# Patient Record
Sex: FEMALE | Race: WHITE | NOT HISPANIC OR LATINO
[De-identification: names, ages, dates, MRNs, and addresses within clinical notes are randomized per-mention and may not be internally consistent; named-entity substitution may affect disease eponyms.]

---

## 2017-03-17 ENCOUNTER — APPOINTMENT (OUTPATIENT)
Dept: ULTRASOUND IMAGING | Facility: CLINIC | Age: 67
End: 2017-03-17

## 2017-03-17 ENCOUNTER — APPOINTMENT (OUTPATIENT)
Dept: MAMMOGRAPHY | Facility: CLINIC | Age: 67
End: 2017-03-17

## 2017-03-17 ENCOUNTER — OUTPATIENT (OUTPATIENT)
Dept: OUTPATIENT SERVICES | Facility: HOSPITAL | Age: 67
LOS: 1 days | End: 2017-03-17
Payer: MEDICARE

## 2017-03-17 DIAGNOSIS — Z90.12 ACQUIRED ABSENCE OF LEFT BREAST AND NIPPLE: Chronic | ICD-10-CM

## 2017-03-17 DIAGNOSIS — Z98.89 OTHER SPECIFIED POSTPROCEDURAL STATES: Chronic | ICD-10-CM

## 2017-03-17 DIAGNOSIS — Z98.49 CATARACT EXTRACTION STATUS, UNSPECIFIED EYE: Chronic | ICD-10-CM

## 2017-03-17 DIAGNOSIS — Z00.8 ENCOUNTER FOR OTHER GENERAL EXAMINATION: ICD-10-CM

## 2017-03-17 DIAGNOSIS — Z41.1 ENCOUNTER FOR COSMETIC SURGERY: Chronic | ICD-10-CM

## 2017-03-17 DIAGNOSIS — Z90.49 ACQUIRED ABSENCE OF OTHER SPECIFIED PARTS OF DIGESTIVE TRACT: Chronic | ICD-10-CM

## 2017-03-17 DIAGNOSIS — Z90.710 ACQUIRED ABSENCE OF BOTH CERVIX AND UTERUS: Chronic | ICD-10-CM

## 2017-04-13 PROCEDURE — 77063 BREAST TOMOSYNTHESIS BI: CPT

## 2017-04-13 PROCEDURE — 77067 SCR MAMMO BI INCL CAD: CPT

## 2018-04-02 ENCOUNTER — OUTPATIENT (OUTPATIENT)
Dept: OUTPATIENT SERVICES | Facility: HOSPITAL | Age: 68
LOS: 1 days | End: 2018-04-02
Payer: MEDICARE

## 2018-04-02 ENCOUNTER — APPOINTMENT (OUTPATIENT)
Dept: MAMMOGRAPHY | Facility: CLINIC | Age: 68
End: 2018-04-02
Payer: MEDICARE

## 2018-04-02 DIAGNOSIS — Z41.1 ENCOUNTER FOR COSMETIC SURGERY: Chronic | ICD-10-CM

## 2018-04-02 DIAGNOSIS — Z90.12 ACQUIRED ABSENCE OF LEFT BREAST AND NIPPLE: Chronic | ICD-10-CM

## 2018-04-02 DIAGNOSIS — Z00.00 ENCOUNTER FOR GENERAL ADULT MEDICAL EXAMINATION WITHOUT ABNORMAL FINDINGS: ICD-10-CM

## 2018-04-02 DIAGNOSIS — Z98.49 CATARACT EXTRACTION STATUS, UNSPECIFIED EYE: Chronic | ICD-10-CM

## 2018-04-02 DIAGNOSIS — Z90.710 ACQUIRED ABSENCE OF BOTH CERVIX AND UTERUS: Chronic | ICD-10-CM

## 2018-04-02 DIAGNOSIS — Z98.89 OTHER SPECIFIED POSTPROCEDURAL STATES: Chronic | ICD-10-CM

## 2018-04-02 DIAGNOSIS — Z90.49 ACQUIRED ABSENCE OF OTHER SPECIFIED PARTS OF DIGESTIVE TRACT: Chronic | ICD-10-CM

## 2018-04-02 PROCEDURE — 77067 SCR MAMMO BI INCL CAD: CPT | Mod: 26,52,RT

## 2018-04-02 PROCEDURE — 77063 BREAST TOMOSYNTHESIS BI: CPT

## 2018-04-02 PROCEDURE — 77063 BREAST TOMOSYNTHESIS BI: CPT | Mod: 26,52

## 2018-04-02 PROCEDURE — 77067 SCR MAMMO BI INCL CAD: CPT

## 2019-04-10 ENCOUNTER — APPOINTMENT (OUTPATIENT)
Dept: MAMMOGRAPHY | Facility: CLINIC | Age: 69
End: 2019-04-10

## 2019-11-12 ENCOUNTER — OUTPATIENT (OUTPATIENT)
Dept: OUTPATIENT SERVICES | Facility: HOSPITAL | Age: 69
LOS: 1 days | End: 2019-11-12
Payer: MEDICARE

## 2019-11-12 ENCOUNTER — APPOINTMENT (OUTPATIENT)
Dept: ULTRASOUND IMAGING | Facility: CLINIC | Age: 69
End: 2019-11-12
Payer: MEDICARE

## 2019-11-12 ENCOUNTER — APPOINTMENT (OUTPATIENT)
Dept: MAMMOGRAPHY | Facility: CLINIC | Age: 69
End: 2019-11-12
Payer: MEDICARE

## 2019-11-12 DIAGNOSIS — Z00.8 ENCOUNTER FOR OTHER GENERAL EXAMINATION: ICD-10-CM

## 2019-11-12 DIAGNOSIS — Z90.12 ACQUIRED ABSENCE OF LEFT BREAST AND NIPPLE: Chronic | ICD-10-CM

## 2019-11-12 DIAGNOSIS — Z41.1 ENCOUNTER FOR COSMETIC SURGERY: Chronic | ICD-10-CM

## 2019-11-12 DIAGNOSIS — Z98.89 OTHER SPECIFIED POSTPROCEDURAL STATES: Chronic | ICD-10-CM

## 2019-11-12 DIAGNOSIS — Z90.710 ACQUIRED ABSENCE OF BOTH CERVIX AND UTERUS: Chronic | ICD-10-CM

## 2019-11-12 DIAGNOSIS — Z98.49 CATARACT EXTRACTION STATUS, UNSPECIFIED EYE: Chronic | ICD-10-CM

## 2019-11-12 DIAGNOSIS — Z90.49 ACQUIRED ABSENCE OF OTHER SPECIFIED PARTS OF DIGESTIVE TRACT: Chronic | ICD-10-CM

## 2019-11-12 PROCEDURE — 76641 ULTRASOUND BREAST COMPLETE: CPT

## 2019-11-12 PROCEDURE — 77067 SCR MAMMO BI INCL CAD: CPT

## 2019-11-12 PROCEDURE — 76641 ULTRASOUND BREAST COMPLETE: CPT | Mod: 26,RT

## 2019-11-12 PROCEDURE — 77067 SCR MAMMO BI INCL CAD: CPT | Mod: 26,RT,52

## 2019-11-12 PROCEDURE — 77065 DX MAMMO INCL CAD UNI: CPT

## 2019-11-12 PROCEDURE — 77063 BREAST TOMOSYNTHESIS BI: CPT

## 2019-11-12 PROCEDURE — G0279: CPT

## 2019-11-12 PROCEDURE — 77063 BREAST TOMOSYNTHESIS BI: CPT | Mod: 26,52

## 2019-11-25 ENCOUNTER — OUTPATIENT (OUTPATIENT)
Dept: OUTPATIENT SERVICES | Facility: HOSPITAL | Age: 69
LOS: 1 days | End: 2019-11-25
Payer: MEDICARE

## 2019-11-25 ENCOUNTER — RESULT REVIEW (OUTPATIENT)
Age: 69
End: 2019-11-25

## 2019-11-25 ENCOUNTER — APPOINTMENT (OUTPATIENT)
Dept: ULTRASOUND IMAGING | Facility: CLINIC | Age: 69
End: 2019-11-25
Payer: MEDICARE

## 2019-11-25 DIAGNOSIS — Z00.8 ENCOUNTER FOR OTHER GENERAL EXAMINATION: ICD-10-CM

## 2019-11-25 DIAGNOSIS — Z98.89 OTHER SPECIFIED POSTPROCEDURAL STATES: Chronic | ICD-10-CM

## 2019-11-25 DIAGNOSIS — Z41.1 ENCOUNTER FOR COSMETIC SURGERY: Chronic | ICD-10-CM

## 2019-11-25 DIAGNOSIS — Z98.49 CATARACT EXTRACTION STATUS, UNSPECIFIED EYE: Chronic | ICD-10-CM

## 2019-11-25 DIAGNOSIS — Z90.12 ACQUIRED ABSENCE OF LEFT BREAST AND NIPPLE: Chronic | ICD-10-CM

## 2019-11-25 DIAGNOSIS — Z90.49 ACQUIRED ABSENCE OF OTHER SPECIFIED PARTS OF DIGESTIVE TRACT: Chronic | ICD-10-CM

## 2019-11-25 DIAGNOSIS — Z90.710 ACQUIRED ABSENCE OF BOTH CERVIX AND UTERUS: Chronic | ICD-10-CM

## 2019-11-25 PROCEDURE — 77065 DX MAMMO INCL CAD UNI: CPT

## 2019-11-25 PROCEDURE — 88305 TISSUE EXAM BY PATHOLOGIST: CPT

## 2019-11-25 PROCEDURE — 19083 BX BREAST 1ST LESION US IMAG: CPT | Mod: RT

## 2019-11-25 PROCEDURE — 19083 BX BREAST 1ST LESION US IMAG: CPT

## 2019-11-25 PROCEDURE — 88305 TISSUE EXAM BY PATHOLOGIST: CPT | Mod: 26

## 2019-11-25 PROCEDURE — A4648: CPT

## 2019-11-25 PROCEDURE — 77065 DX MAMMO INCL CAD UNI: CPT | Mod: 26,RT

## 2020-03-24 ENCOUNTER — EMERGENCY (EMERGENCY)
Facility: HOSPITAL | Age: 70
LOS: 1 days | Discharge: HOME | End: 2020-03-24
Attending: EMERGENCY MEDICINE
Payer: MEDICARE

## 2020-03-24 VITALS
OXYGEN SATURATION: 98 % | HEART RATE: 84 BPM | DIASTOLIC BLOOD PRESSURE: 60 MMHG | SYSTOLIC BLOOD PRESSURE: 106 MMHG | RESPIRATION RATE: 16 BRPM | HEIGHT: 64 IN | WEIGHT: 259.93 LBS

## 2020-03-24 DIAGNOSIS — Z41.1 ENCOUNTER FOR COSMETIC SURGERY: Chronic | ICD-10-CM

## 2020-03-24 DIAGNOSIS — Z90.12 ACQUIRED ABSENCE OF LEFT BREAST AND NIPPLE: Chronic | ICD-10-CM

## 2020-03-24 DIAGNOSIS — Z90.49 ACQUIRED ABSENCE OF OTHER SPECIFIED PARTS OF DIGESTIVE TRACT: Chronic | ICD-10-CM

## 2020-03-24 DIAGNOSIS — Z98.89 OTHER SPECIFIED POSTPROCEDURAL STATES: Chronic | ICD-10-CM

## 2020-03-24 DIAGNOSIS — Z90.710 ACQUIRED ABSENCE OF BOTH CERVIX AND UTERUS: Chronic | ICD-10-CM

## 2020-03-24 DIAGNOSIS — Z98.49 CATARACT EXTRACTION STATUS, UNSPECIFIED EYE: Chronic | ICD-10-CM

## 2020-03-24 LAB
ALBUMIN SERPL ELPH-MCNC: 3.5 G/DL — SIGNIFICANT CHANGE UP (ref 3.3–5)
ALP SERPL-CCNC: 82 U/L — SIGNIFICANT CHANGE UP (ref 40–120)
ALT FLD-CCNC: 24 U/L — SIGNIFICANT CHANGE UP (ref 10–45)
ANION GAP SERPL CALC-SCNC: 12 MMOL/L — SIGNIFICANT CHANGE UP (ref 5–17)
ANISOCYTOSIS BLD QL: SLIGHT — SIGNIFICANT CHANGE UP
APTT BLD: 32.3 SEC — SIGNIFICANT CHANGE UP (ref 27.5–36.3)
AST SERPL-CCNC: 22 U/L — SIGNIFICANT CHANGE UP (ref 10–40)
BASOPHILS # BLD AUTO: 0.01 K/UL — SIGNIFICANT CHANGE UP (ref 0–0.2)
BASOPHILS NFR BLD AUTO: 0.4 % — SIGNIFICANT CHANGE UP (ref 0–2)
BILIRUB SERPL-MCNC: 0.2 MG/DL — SIGNIFICANT CHANGE UP (ref 0.2–1.2)
BLD GP AB SCN SERPL QL: NEGATIVE — SIGNIFICANT CHANGE UP
BUN SERPL-MCNC: 23 MG/DL — SIGNIFICANT CHANGE UP (ref 7–23)
CALCIUM SERPL-MCNC: 7.9 MG/DL — LOW (ref 8.4–10.5)
CHLORIDE SERPL-SCNC: 107 MMOL/L — SIGNIFICANT CHANGE UP (ref 96–108)
CO2 SERPL-SCNC: 21 MMOL/L — LOW (ref 22–31)
CREAT SERPL-MCNC: 0.99 MG/DL — SIGNIFICANT CHANGE UP (ref 0.5–1.3)
DACRYOCYTES BLD QL SMEAR: SLIGHT — SIGNIFICANT CHANGE UP
EOSINOPHIL # BLD AUTO: 0.01 K/UL — SIGNIFICANT CHANGE UP (ref 0–0.5)
EOSINOPHIL NFR BLD AUTO: 0.4 % — SIGNIFICANT CHANGE UP (ref 0–6)
GLUCOSE BLDC GLUCOMTR-MCNC: 174 MG/DL — HIGH (ref 70–99)
GLUCOSE SERPL-MCNC: 160 MG/DL — HIGH (ref 70–99)
HCT VFR BLD CALC: 21.4 % — LOW (ref 34.5–45)
HGB BLD-MCNC: 7 G/DL — CRITICAL LOW (ref 11.5–15.5)
IMM GRANULOCYTES NFR BLD AUTO: 1.2 % — SIGNIFICANT CHANGE UP (ref 0–1.5)
INR BLD: 1.92 RATIO — HIGH (ref 0.88–1.16)
LDH SERPL L TO P-CCNC: 181 U/L — SIGNIFICANT CHANGE UP (ref 50–242)
LYMPHOCYTES # BLD AUTO: 1.45 K/UL — SIGNIFICANT CHANGE UP (ref 1–3.3)
LYMPHOCYTES # BLD AUTO: 59.4 % — HIGH (ref 13–44)
MACROCYTES BLD QL: SLIGHT — SIGNIFICANT CHANGE UP
MANUAL SMEAR VERIFICATION: SIGNIFICANT CHANGE UP
MCHC RBC-ENTMCNC: 31.4 PG — SIGNIFICANT CHANGE UP (ref 27–34)
MCHC RBC-ENTMCNC: 32.7 GM/DL — SIGNIFICANT CHANGE UP (ref 32–36)
MCV RBC AUTO: 96 FL — SIGNIFICANT CHANGE UP (ref 80–100)
MICROCYTES BLD QL: SLIGHT — SIGNIFICANT CHANGE UP
MONOCYTES # BLD AUTO: 0.21 K/UL — SIGNIFICANT CHANGE UP (ref 0–0.9)
MONOCYTES NFR BLD AUTO: 8.6 % — SIGNIFICANT CHANGE UP (ref 2–14)
NEUTROPHILS # BLD AUTO: 0.73 K/UL — LOW (ref 1.8–7.4)
NEUTROPHILS NFR BLD AUTO: 30 % — LOW (ref 43–77)
OB PNL STL: NEGATIVE — SIGNIFICANT CHANGE UP
PLAT MORPH BLD: NORMAL — SIGNIFICANT CHANGE UP
PLATELET # BLD AUTO: 15 K/UL — CRITICAL LOW (ref 150–400)
POLYCHROMASIA BLD QL SMEAR: SLIGHT — SIGNIFICANT CHANGE UP
POTASSIUM SERPL-MCNC: 4.3 MMOL/L — SIGNIFICANT CHANGE UP (ref 3.5–5.3)
POTASSIUM SERPL-SCNC: 4.3 MMOL/L — SIGNIFICANT CHANGE UP (ref 3.5–5.3)
PROT SERPL-MCNC: 6.3 G/DL — SIGNIFICANT CHANGE UP (ref 6–8.3)
PROTHROM AB SERPL-ACNC: 22.5 SEC — HIGH (ref 10–12.9)
RBC # BLD: 2.23 M/UL — LOW (ref 3.8–5.2)
RBC # FLD: 17.1 % — HIGH (ref 10.3–14.5)
RBC BLD AUTO: ABNORMAL
RH IG SCN BLD-IMP: POSITIVE — SIGNIFICANT CHANGE UP
SODIUM SERPL-SCNC: 140 MMOL/L — SIGNIFICANT CHANGE UP (ref 135–145)
WBC # BLD: 2.44 K/UL — LOW (ref 3.8–10.5)
WBC # FLD AUTO: 2.44 K/UL — LOW (ref 3.8–10.5)

## 2020-03-24 PROCEDURE — 71045 X-RAY EXAM CHEST 1 VIEW: CPT | Mod: 26

## 2020-03-24 PROCEDURE — 99218: CPT

## 2020-03-24 RX ORDER — DEXTROSE 50 % IN WATER 50 %
12.5 SYRINGE (ML) INTRAVENOUS ONCE
Refills: 0 | Status: DISCONTINUED | OUTPATIENT
Start: 2020-03-24 | End: 2020-03-28

## 2020-03-24 RX ORDER — DEXTROSE 50 % IN WATER 50 %
15 SYRINGE (ML) INTRAVENOUS ONCE
Refills: 0 | Status: DISCONTINUED | OUTPATIENT
Start: 2020-03-24 | End: 2020-03-28

## 2020-03-24 RX ORDER — DEXTROSE 50 % IN WATER 50 %
25 SYRINGE (ML) INTRAVENOUS ONCE
Refills: 0 | Status: DISCONTINUED | OUTPATIENT
Start: 2020-03-24 | End: 2020-03-28

## 2020-03-24 RX ORDER — HUMAN INSULIN 100 [IU]/ML
20 INJECTION, SUSPENSION SUBCUTANEOUS AT BEDTIME
Refills: 0 | Status: DISCONTINUED | OUTPATIENT
Start: 2020-03-24 | End: 2020-03-28

## 2020-03-24 RX ORDER — SODIUM CHLORIDE 9 MG/ML
1000 INJECTION, SOLUTION INTRAVENOUS
Refills: 0 | Status: DISCONTINUED | OUTPATIENT
Start: 2020-03-24 | End: 2020-03-28

## 2020-03-24 RX ORDER — CALCIUM CARBONATE 500(1250)
1 TABLET ORAL ONCE
Refills: 0 | Status: COMPLETED | OUTPATIENT
Start: 2020-03-24 | End: 2020-03-25

## 2020-03-24 RX ORDER — AMLODIPINE BESYLATE 2.5 MG/1
10 TABLET ORAL ONCE
Refills: 0 | Status: COMPLETED | OUTPATIENT
Start: 2020-03-24 | End: 2020-03-25

## 2020-03-24 RX ORDER — HUMAN INSULIN 100 [IU]/ML
20 INJECTION, SUSPENSION SUBCUTANEOUS
Refills: 0 | Status: DISCONTINUED | OUTPATIENT
Start: 2020-03-24 | End: 2020-03-28

## 2020-03-24 RX ORDER — GLUCAGON INJECTION, SOLUTION 0.5 MG/.1ML
1 INJECTION, SOLUTION SUBCUTANEOUS ONCE
Refills: 0 | Status: DISCONTINUED | OUTPATIENT
Start: 2020-03-24 | End: 2020-03-28

## 2020-03-24 RX ORDER — LOSARTAN POTASSIUM 100 MG/1
100 TABLET, FILM COATED ORAL DAILY
Refills: 0 | Status: DISCONTINUED | OUTPATIENT
Start: 2020-03-24 | End: 2020-03-28

## 2020-03-24 RX ORDER — ATORVASTATIN CALCIUM 80 MG/1
10 TABLET, FILM COATED ORAL AT BEDTIME
Refills: 0 | Status: DISCONTINUED | OUTPATIENT
Start: 2020-03-24 | End: 2020-03-28

## 2020-03-24 RX ADMIN — HUMAN INSULIN 20 UNIT(S): 100 INJECTION, SUSPENSION SUBCUTANEOUS at 18:54

## 2020-03-24 NOTE — ED PROVIDER NOTE - PSH
H/O rhinoplasty    S/P cataract extraction, unspecified laterality  bilateral  S/P cholecystectomy  1980, open  S/P hysterectomy with oophorectomy  2011 for dermoid cyst x 2  S/P left mastectomy  1994  S/P lumpectomy, right breast  2001

## 2020-03-24 NOTE — ED CDU PROVIDER INITIAL DAY NOTE - PROGRESS NOTE DETAILS
D/w ED FT attending Dr. Gill regarding repeat CBC for platelets, will repeat all after 2nd unit CBC transfusion. - Vijay Adair PA-C CDU PROGRESS NOTE DONNA GONZALEZ: Received pt at 1900 sign-out. Pt resting in stretcher in NAD, completed 1st unit PRBC. Case/plan reviewed. VSS. Pt is aox3, ambulatory around unit with steady gait. S1 S2 noted, RRR, lungs CTA b/l, BS x4 with soft, nontender abdomen. Pt states she still feels weakness and fatigue, made worst on exertion. Pt denies any bleeding episodes. Will continue closely monitor overnight. CDU PROGRESS NOTE PA CARLOS: Pt resting comfortably, NAD, VSS. Patient completed 2unit PRBC. Will monitor, repeat h/h. CDU PROGRESS NOTE DONNA GONZALEZ: Received pt at 1900 sign-out. Pt resting in stretcher in NAD, completed 1st unit PRBC. Case/plan reviewed. VSS. Pt is aox3, ambulatory around unit with steady gait. Skin (+) petechia to lower and upper extremity bilaterally. S1 S2 noted, RRR, lungs CTA b/l, BS x4 with soft, nontender abdomen. No active bleeding noted. Pt states she still feels weakness and fatigue, made worst on exertion. Pt denies any bleeding episodes. Will continue closely monitor overnight.

## 2020-03-24 NOTE — ED PROVIDER NOTE - CLINICAL SUMMARY MEDICAL DECISION MAKING FREE TEXT BOX
69F w/ CH /weakness, anemic - on chemo - will check labs, likely transfuse, reassess Dr. Taylor (Attending Physician)  69F w/ CH /weakness, anemic to 7, thombocytopenic to 16 on outside labs, mild shortness of breath, no cough, or fever- on chemo - will check labs, likely secondary to anemia transfuse, reassess

## 2020-03-24 NOTE — ED CDU PROVIDER INITIAL DAY NOTE - MEDICAL DECISION MAKING DETAILS
Dr. Taylor (Attending Physician)  Pt. with ho breast ca on chemo pw shortness of breath, no cough or fever. Found to be anemic and thrombocytopenic. Patient has been self isolating to avoid COVID and does not want admission during COVID so will observe for 2 units rbc and plts transfusion.

## 2020-03-24 NOTE — ED ADULT NURSE NOTE - CHPI ED NUR SYMPTOMS NEG
no chills/no decreased eating/drinking/no tingling/no dizziness/no pain/no nausea/no vomiting/no fever

## 2020-03-24 NOTE — ED PROVIDER NOTE - OBJECTIVE STATEMENT
69F w/ pmh breast CA (s/p mastectomy, on chemo since 1/2020, last got abraxane 1 wk ago), DM -- sent from infusion center for weakness, Hb 7.1 +weakness, CH. No recent travel, medication change, illness, or hospitalization. No fever, n/v/d/c, chest / abd pain, cough, sob, dizziness, dysuria/hematuria.    Labs done today 3/24 outpatient: wbc 1.4, hgb 7.1, plt 16, neurophils 0.6 #    ONc: Patrick Jack 69F w/ pmh breast CA (s/p mastectomy, on chemo since 1/2020, last got abraxane 1 wk ago), DM -- sent from infusion center for weakness, Hb 7.1 +weakness, CH. No recent travel, medication change, illness, or hospitalization. No fever, n/v/d/c, chest / abd pain, cough, sob, dizziness, dysuria/hematuria.    Labs done today 3/24 outpatient: wbc 1.4, hgb 7.1, plt 16, neurophils 0.6 #    Onc: Emerson Jack

## 2020-03-24 NOTE — ED ADULT NURSE NOTE - OBJECTIVE STATEMENT
69y F aaox4 presents to ED via EMS from [facility] Hx breast Ca c/o Low Hg/Ht, as per pt she is current in chemotherapy every week, today went to get one and blood work was done prior to chemo infusion and resulted Low Hg/Ht low and Dr advice to come to ED for evaluation and maybe for blood transfusion, Pt c/o weakness x1 week , last chemo treatment was last Tuesday. Pt states that she had last week UTI was treatment w/ antibiotic  finished last Sunday the treatment.   Pt denies CP, SOB, HA, vision changes, n/v/d, fevers chills, abdominal pain, Safety and comfort measures initiated- bed placed in lowest position and side rails raised. Pt oriented to call bell system. 69y F aaox4 presents to ED via EMS from [facility] Hx breast Ca c/o Low Hg/Ht, as per pt she is current in chemotherapy every week, today went to get one and blood work was done prior to chemo infusion and resulted Low Hg/Ht low and Dr advice to come to ED for evaluation and maybe for blood transfusion, Pt c/o weakness x1 week , SOB on exertion(chronic) ,  last chemo treatment was last Tuesday. Pt states that she had last week UTI was treatment w/ antibiotic  finished last Sunday the treatment. Pt presents w/ power port dressing ready to using, no s/s of infection, no redness, swelling noted.    Pt denies CP, SOB, HA, vision changes, n/v/d, fevers chills, abdominal pain, Safety and comfort measures initiated- bed placed in lowest position and side rails raised. Pt oriented to call bell system.

## 2020-03-24 NOTE — ED CDU PROVIDER INITIAL DAY NOTE - DETAILS
SYMPTOMATIC ANEMIA/THROMBOCYTOPENIA  -1 unit platelets  -2 units PRBC  -Repeat CBC post transfusions.  -[facility]Onc following  Case discussed w/ ED attending.

## 2020-03-24 NOTE — ED ADULT NURSE NOTE - NSIMPLEMENTINTERV_GEN_ALL_ED
Implemented All Fall Risk Interventions:  [place] to call system. Call bell, personal items and telephone within reach. Instruct patient to call for assistance. Room bathroom lighting operational. Non-slip footwear when patient is off stretcher. Physically safe environment: no spills, clutter or unnecessary equipment. Stretcher in lowest position, wheels locked, appropriate side rails in place. Provide visual cue, wrist band, yellow gown, etc. Monitor gait and stability. Monitor for mental status changes and reorient to person, place, and time. Review medications for side effects contributing to fall risk. Reinforce activity limits and safety measures with patient and family.

## 2020-03-24 NOTE — ED ADULT NURSE NOTE - ED STAT RN HANDOFF DETAILS
Pt to be transported to CDU. Pt aware of transfer of care. Pt AAOx4, resp nonlabored, resting comfortably in bed. Vital signs stable. Patient in stable condition. Pt w/ blood running . Safety maintained. Report called to Michell HUTCHINSON. Pt awaiting transport to CDU.

## 2020-03-24 NOTE — ED PROVIDER NOTE - PROGRESS NOTE DETAILS
Ren Lay PGY3: paged [facility] oncologist - Damian Ren Lay PGY3: paged [facility] oncologist - toño Godoy - agreed w/ plan for transfuse and discharge - she will convey to Dr. Salgado Ren Lay PGY3: request that patient follow up with heme onc clinic tomorrow if discharged

## 2020-03-24 NOTE — ED PROVIDER NOTE - PMH
Afib  new onset 2/24/16 on ASA only  Anxiety and depression    Breast cancer in female  s/p L mastectomy, right lumpectomy, radiation 2011  Calculus of right kidney    CRVO (central retinal vein occlusion), right  currently receiving care by specialist  Dermoid cyst of ovary, unspecified laterality  bilateral  Diabetes    Diabetic retinopathy    Hematuria  [facility] cytology 2/24/16  History of breast cancer  1994 left mast, chemo  2001 right lumpectomy, RT  History of cholelithiasis  1980  Hyperlipidemia, unspecified hyperlipidemia type    Hypertension    Hypertension    Obesity (BMI 35.0-39.9 without comorbidity)    Osteoporosis    Type 2 diabetes mellitus  since 1994, most recent HgA1c 9.8 (2/11/16)

## 2020-03-24 NOTE — ED CLERICAL - NS ED CLERK NOTE PRE-ARRIVAL INFORMATION; ADDITIONAL PRE-ARRIVAL INFORMATION
CC/Reason For referral: Pancytopenia, breast cancer on chemo  Preferred Consultant(if applicable):  Who admits for you (if needed): self  Do you have documents you would like to fax over? sent with pt  Would you still like to speak to an ED attending?yes

## 2020-03-24 NOTE — ED CDU PROVIDER INITIAL DAY NOTE - OBJECTIVE STATEMENT
69F w/ pmh breast CA (s/p mastectomy, on chemo since 1/2020, last got abraxane 1 wk ago), DM -- sent from infusion center for weakness, Hb 7.1 +weakness, CH. No recent travel, medication change, illness, or hospitalization. No fever, n/v/d/c, chest / abd pain, cough, sob, dizziness, dysuria/hematuria.  Onc: mEerson Jack    ED Course: Platelets 15, H/H 7.0/21.4, stool guaiac negative. ED team discussed case w/ [facility] oncologist, neulasta given per their rec, additionally recommended 1 unit platelets and 2 units PRBC and obs overnight for repeat bloodwork. Pt received 1 unit platelets in main ED w/o issue. Pt started on PRBC transfusion in main ER w/o reaction and sent to CDU for continued transfusion. Case discussed w/ ED attending. Dr. Taylor.

## 2020-03-24 NOTE — ED CDU PROVIDER INITIAL DAY NOTE - ATTENDING CONTRIBUTION TO CARE
Dr. Taylor (Attending Physician)  I performed a history and physical exam of the patient and discussed their management with the advanced care provider. I reviewed the advanced care provider's note and agree with the documented findings and plan of care. My medical decision making and objective findings are found above.

## 2020-03-24 NOTE — ED PROVIDER NOTE - ATTENDING CONTRIBUTION TO CARE
Dr. Taylor (Attending Physician)  I performed a history and physical exam of the patient and discussed their management with the resident. I reviewed the resident's note and agree with the documented findings and plan of care. My medical decision making and observations are found above.

## 2020-03-24 NOTE — ED PROVIDER NOTE - PHYSICAL EXAMINATION
*GEN:   pale appearing, AOx3  *EYES:   pupils equally round and reactive to light, extra-occular movements intact  *HEENT:   airway patent, moist mucosal membranes, full ROM neck  *CV:   regular rate and rhythm  *RESP:   clear to auscultation bilaterally, non-labored  *ABD:   soft, non-tender  *:   no cva/flank tenderness  *EXTREM:   no MSK tenderness, full ROM throughout, no leg swelling  *SKIN:   dry, intact  *NEURO:   AOx3, no focal weakness or loss of sensation

## 2020-03-25 VITALS
TEMPERATURE: 98 F | HEART RATE: 94 BPM | RESPIRATION RATE: 18 BRPM | SYSTOLIC BLOOD PRESSURE: 148 MMHG | OXYGEN SATURATION: 99 % | DIASTOLIC BLOOD PRESSURE: 85 MMHG

## 2020-03-25 LAB
GLUCOSE BLDC GLUCOMTR-MCNC: 107 MG/DL — HIGH (ref 70–99)
GLUCOSE BLDC GLUCOMTR-MCNC: 139 MG/DL — HIGH (ref 70–99)
HCT VFR BLD CALC: 23.9 % — LOW (ref 34.5–45)
HCT VFR BLD CALC: 26 % — LOW (ref 34.5–45)
HGB BLD-MCNC: 8.1 G/DL — LOW (ref 11.5–15.5)
HGB BLD-MCNC: 8.3 G/DL — LOW (ref 11.5–15.5)
MCHC RBC-ENTMCNC: 29.2 PG — SIGNIFICANT CHANGE UP (ref 27–34)
MCHC RBC-ENTMCNC: 30.8 PG — SIGNIFICANT CHANGE UP (ref 27–34)
MCHC RBC-ENTMCNC: 31.9 GM/DL — LOW (ref 32–36)
MCHC RBC-ENTMCNC: 33.9 GM/DL — SIGNIFICANT CHANGE UP (ref 32–36)
MCV RBC AUTO: 90.9 FL — SIGNIFICANT CHANGE UP (ref 80–100)
MCV RBC AUTO: 91.5 FL — SIGNIFICANT CHANGE UP (ref 80–100)
NRBC # BLD: 2 /100 WBCS — HIGH (ref 0–0)
NRBC # BLD: 2 /100 WBCS — HIGH (ref 0–0)
PLATELET # BLD AUTO: 13 K/UL — CRITICAL LOW (ref 150–400)
PLATELET # BLD AUTO: 35 K/UL — LOW (ref 150–400)
RBC # BLD: 2.63 M/UL — LOW (ref 3.8–5.2)
RBC # BLD: 2.84 M/UL — LOW (ref 3.8–5.2)
RBC # FLD: 17.4 % — HIGH (ref 10.3–14.5)
RBC # FLD: 17.7 % — HIGH (ref 10.3–14.5)
WBC # BLD: 3.38 K/UL — LOW (ref 3.8–10.5)
WBC # BLD: 3.52 K/UL — LOW (ref 3.8–10.5)
WBC # FLD AUTO: 3.38 K/UL — LOW (ref 3.8–10.5)
WBC # FLD AUTO: 3.52 K/UL — LOW (ref 3.8–10.5)

## 2020-03-25 PROCEDURE — 86850 RBC ANTIBODY SCREEN: CPT

## 2020-03-25 PROCEDURE — 36430 TRANSFUSION BLD/BLD COMPNT: CPT

## 2020-03-25 PROCEDURE — 71045 X-RAY EXAM CHEST 1 VIEW: CPT

## 2020-03-25 PROCEDURE — 85027 COMPLETE CBC AUTOMATED: CPT

## 2020-03-25 PROCEDURE — G0378: CPT

## 2020-03-25 PROCEDURE — 80053 COMPREHEN METABOLIC PANEL: CPT

## 2020-03-25 PROCEDURE — 93005 ELECTROCARDIOGRAM TRACING: CPT

## 2020-03-25 PROCEDURE — 86900 BLOOD TYPING SEROLOGIC ABO: CPT

## 2020-03-25 PROCEDURE — P9016: CPT

## 2020-03-25 PROCEDURE — 82962 GLUCOSE BLOOD TEST: CPT

## 2020-03-25 PROCEDURE — 85730 THROMBOPLASTIN TIME PARTIAL: CPT

## 2020-03-25 PROCEDURE — 82272 OCCULT BLD FECES 1-3 TESTS: CPT

## 2020-03-25 PROCEDURE — 99285 EMERGENCY DEPT VISIT HI MDM: CPT | Mod: 25

## 2020-03-25 PROCEDURE — 86901 BLOOD TYPING SEROLOGIC RH(D): CPT

## 2020-03-25 PROCEDURE — 83615 LACTATE (LD) (LDH) ENZYME: CPT

## 2020-03-25 PROCEDURE — 85384 FIBRINOGEN ACTIVITY: CPT

## 2020-03-25 PROCEDURE — P9037: CPT

## 2020-03-25 PROCEDURE — 86923 COMPATIBILITY TEST ELECTRIC: CPT

## 2020-03-25 PROCEDURE — 85610 PROTHROMBIN TIME: CPT

## 2020-03-25 PROCEDURE — 99217: CPT

## 2020-03-25 RX ORDER — ACETAMINOPHEN 500 MG
650 TABLET ORAL ONCE
Refills: 0 | Status: COMPLETED | OUTPATIENT
Start: 2020-03-25 | End: 2020-03-25

## 2020-03-25 RX ORDER — NYSTATIN CREAM 100000 [USP'U]/G
1 CREAM TOPICAL
Refills: 0 | Status: DISCONTINUED | OUTPATIENT
Start: 2020-03-25 | End: 2020-03-28

## 2020-03-25 RX ADMIN — Medication 650 MILLIGRAM(S): at 08:19

## 2020-03-25 RX ADMIN — HUMAN INSULIN 20 UNIT(S): 100 INJECTION, SUSPENSION SUBCUTANEOUS at 08:19

## 2020-03-25 RX ADMIN — NYSTATIN CREAM 1 APPLICATION(S): 100000 CREAM TOPICAL at 11:34

## 2020-03-25 RX ADMIN — Medication 1 TABLET(S): at 00:06

## 2020-03-25 RX ADMIN — LOSARTAN POTASSIUM 100 MILLIGRAM(S): 100 TABLET, FILM COATED ORAL at 06:26

## 2020-03-25 RX ADMIN — AMLODIPINE BESYLATE 10 MILLIGRAM(S): 2.5 TABLET ORAL at 00:06

## 2020-03-25 NOTE — ED CDU PROVIDER DISPOSITION NOTE - CLINICAL COURSE
69F w/ pmh breast CA (s/p mastectomy, on chemo since 1/2020, last got abraxane 1 wk ago), DM -- sent from infusion center for weakness, Hb 7.1 +weakness, CH. No recent travel, medication change, illness, or hospitalization. No fever, n/v/d/c, chest / abd pain, cough, sob, dizziness, dysuria/hematuria.  	Onc: Emerson Jack    	ED Course: Platelets 15, H/H 7.0/21.4, stool guaiac negative. ED team discussed case w/ [facility] oncologist, neulasta given per their rec, additionally recommended 1 unit platelets and 2 units PRBC and obs overnight for repeat bloodwork. Pt received 1 unit platelets in main ED w/o issue. Pt started on PRBC transfusion in main ER w/o reaction and sent to CDU for continued transfusion. In CDU, Pt completed 1 unit Platelets and 2 units PRBC w/o complications. Repeat labs.......... 69F w/ pmh breast CA (s/p mastectomy, on chemo since 1/2020, last got abraxane 1 wk ago), DM -- sent from infusion center for weakness, Hb 7.1 +weakness, CH. No recent travel, medication change, illness, or hospitalization. No fever, n/v/d/c, chest / abd pain, cough, sob, dizziness, dysuria/hematuria.    ED Course: Platelets 15, H/H 7.0/21.4, stool guaiac negative. ED team discussed case w/ [facility] oncologist, neulasta given per their rec, additionally recommended 1 unit platelets and 2 units PRBC and obs overnight for repeat bloodwork. Pt received 1 unit platelets in main ED w/o issue. Pt started on PRBC transfusion in main ER w/o reaction and sent to CDU for continued transfusion. In CDU, Pt completed 1 unit Platelets and 2 units PRBC w/o complications. Discussed case with heme onc Dr. Salgado and recommended additional unit of platelets while in CDU with repeat CBC. Platelets improved to 35 and Dr. Salgado recommended discharge with pt to be seen in his office tomorrow at 9:30. Cleared for discharge per ED attending Dr. Callahan

## 2020-03-25 NOTE — ED CDU PROVIDER SUBSEQUENT DAY NOTE - HISTORY
69F w/ pmh breast CA (s/p mastectomy, on chemo since 1/2020, last got abraxane 1 wk ago), DM sent from infusion center for Symptomatic anemia/thrombocytopenia. Platelets 15, H/H 7.0/21.4, stool guaiac negative. ED team discussed case w/ [facility] oncologist, neulasta given per their rec, additionally recommended 1 unit platelets and 2 units PRBC and obs overnight for repeat bloodwork. Pt completed 1 unit Platelets and 2 units PRBC w/o complications. Will closely monitor and repeat h/h. 69F w/ pmh breast CA (s/p mastectomy, on chemo since 1/2020, last got abraxane 1 wk ago), DM sent from infusion center for Symptomatic anemia/thrombocytopenia. Platelets 15, H/H 7.0/21.4, stool guaiac negative. Pt completed 1 unit Platelets and 2 units PRBC w/o complications. Will closely monitor and repeat labs.

## 2020-03-25 NOTE — ED ADULT NURSE REASSESSMENT NOTE - NS ED NURSE REASSESS COMMENT FT1
Provided food to pt., tolerated   well
Pt report received from  Maikol HUTCHINSON. Pt transferred to cdu Bed 1 for transfusion of 2 units of PRBCs. Pt a/ox3 denies respiratory distress, sob, dyspnea, C/P, palpitations, n/v/d at this time. Pt states symptoms have improved.  VSS, afebrile, IV clean/dry/intact. Pt aware of plan of care, call bell within reach ,safety maintained. Will monitor and assess.
RBC going. Consent in chart. Risks and benefits explained to patient. Patient verbalized understanding of risks and benefits. Patient aware of possible side effects. Vital signs stable. Second RN at bedside for confirmation.
platelets given. Consent in chart. Risks and benefits explained to patient. Patient verbalized understanding of risks and benefits. Patient aware of possible side effects. Vital signs stable. Second RN at bedside for confirmation.
Pt received from JASPER Galarza. Pt oriented to CDU & plan of care was discussed. Pt received receiving 1st U of PRBC. Pt aware of S&S of trasnfusion reaction and will notify RN or PA of any symptoms. Pt states she feels about the same and is having CH, fatigue and weakness. Pt denies any lightheadedness/ dizziness. Safety & comfort measures maintained. Call bell in reach. Will continue to monitor.
07.00 Am Received report from Isis Andrew pt is observed for Anemia and low platelets. Pt is A&OX 4  Rosalind N/V/D fever chills Cp Sob   Pt is ambulatory with steady gait  Comfort care& safety measures continued Pt has Mediport its functioning well   dressing Dry & intact  has stable vitals  continue to monitor   08.20 platelet 1 unit started

## 2020-03-25 NOTE — ED CDU PROVIDER DISPOSITION NOTE - NSFOLLOWUPINSTRUCTIONS_ED_ALL_ED_FT
(1) You will need to follow-up with your PMD in 2-3 days for your anemia.   A copy of your results were given to you to bring to your appt.  (2) Read attached discharge paperwork.  (3) Drink plenty of fluids to stay hydrated.  (4) Return to ER for lightheaded/dizziness, chest pain, palpitations, shortness of breath, active bleeding, or any other concerns. (1) See your hematologist Dr. Nura Salgado tomorrow at 9:30AM   A copy of your results were given to you to bring to your appt.  (2) Read attached discharge paperwork.  (3) Drink plenty of fluids to stay hydrated.  (4) Return to ER for lightheaded/dizziness, chest pain, palpitations, shortness of breath, active bleeding, or any other concerns.

## 2020-03-25 NOTE — ED CDU PROVIDER DISPOSITION NOTE - ATTENDING CONTRIBUTION TO CARE
Patient seen and evaluated at bedside.  Presented for anemia/thrombocytopenia in context of breast ca c recent initiation of chemotherapy.  Noted petechiae on examination today.  H/H have improved to Hbg > 8 but plt downtrending 15-->13 despite 1u transfusion.  Provided with additional unit plt prior to my evaluation.  States only complaint at present is vaginal itching.  Area examined, no sergio erythematous/urticarial rash but noted to have excoriation in R inguinal crease.  No sob/cp/dizziness/wheeze.  No sergio e/o bleeding.  Will rpt cbc, s/w hemeonc, reassess for disposition.  --BMM

## 2020-03-25 NOTE — ED CDU PROVIDER DISPOSITION NOTE - PATIENT PORTAL LINK FT
You can access the FollowMyHealth Patient Portal offered by [facility] by registering at the following website: [email or web address]. By joining [employer]’s FollowMyHealth portal, you will also be able to view your health information using other applications (apps) compatible with our system.

## 2020-03-25 NOTE — ED CDU PROVIDER SUBSEQUENT DAY NOTE - FAMILY HISTORY
Father  Still living? No  Family history of lung cancer, Age at diagnosis: Age Unknown  Family history of acute myocardial infarction, Age at diagnosis: Age Unknown     Sibling  Still living? No  Family history of diabetes mellitus, Age at diagnosis: Age Unknown     Mother  Still living? Unknown  Family history of non-Hodgkin's lymphoma, Age at diagnosis: Age Unknown

## 2020-03-25 NOTE — ED CDU PROVIDER SUBSEQUENT DAY NOTE - PROGRESS NOTE DETAILS
CDU PROGRESS NOTE PA CARLOS: Pt resting comfortably, without complaint. NAD, VSS. Patient denies any bleeding episodes. Repeat Labs pending. CDU PROGRESS NOTE DONNA GONZALEZ: Repeat H/H 8.3/26.0, Platelet from 15--13. c/d/w Dr. Salgado, hem/onc, recommends patient get 1 more unit Platelets and repeat cbc afterwards. Plan to notify with results. DONNA Chang: Patient seen at bedside in NAD.  VSS.  Patient resting comfortably without complaints. patient receiving platelets right now. will call Dr. Salgado with f/u results to determine disposition DONNA Chang: discussed CBC results with heme onc Dr. Salgado who recommended discharge home and will see tomorrow at 9:30AM. ED attending Dr. Callahan aware and agreed

## 2020-03-25 NOTE — ED CDU PROVIDER DISPOSITION NOTE - CARE PROVIDER_API CALL
Nura Chirinos (MD)  Hematology; Internal Medicine; Medical Oncology  [address], [address]  [place] [address]  Phone: [phone]  Fax: [phone]  Follow Up Time: 1-3 Days

## 2020-07-21 ENCOUNTER — OUTPATIENT (OUTPATIENT)
Dept: OUTPATIENT SERVICES | Facility: HOSPITAL | Age: 70
LOS: 1 days | End: 2020-07-21
Payer: MEDICARE

## 2020-07-21 VITALS
DIASTOLIC BLOOD PRESSURE: 76 MMHG | HEART RATE: 66 BPM | RESPIRATION RATE: 18 BRPM | SYSTOLIC BLOOD PRESSURE: 122 MMHG | HEIGHT: 64 IN | OXYGEN SATURATION: 98 % | WEIGHT: 238.98 LBS | TEMPERATURE: 98 F

## 2020-07-21 DIAGNOSIS — Z90.12 ACQUIRED ABSENCE OF LEFT BREAST AND NIPPLE: Chronic | ICD-10-CM

## 2020-07-21 DIAGNOSIS — E11.9 TYPE 2 DIABETES MELLITUS WITHOUT COMPLICATIONS: ICD-10-CM

## 2020-07-21 DIAGNOSIS — C50.111 MALIGNANT NEOPLASM OF CENTRAL PORTION OF RIGHT FEMALE BREAST: ICD-10-CM

## 2020-07-21 DIAGNOSIS — Z98.89 OTHER SPECIFIED POSTPROCEDURAL STATES: Chronic | ICD-10-CM

## 2020-07-21 DIAGNOSIS — Z92.21 PERSONAL HISTORY OF ANTINEOPLASTIC CHEMOTHERAPY: Chronic | ICD-10-CM

## 2020-07-21 DIAGNOSIS — Z90.710 ACQUIRED ABSENCE OF BOTH CERVIX AND UTERUS: Chronic | ICD-10-CM

## 2020-07-21 DIAGNOSIS — Z98.49 CATARACT EXTRACTION STATUS, UNSPECIFIED EYE: Chronic | ICD-10-CM

## 2020-07-21 DIAGNOSIS — Z90.49 ACQUIRED ABSENCE OF OTHER SPECIFIED PARTS OF DIGESTIVE TRACT: Chronic | ICD-10-CM

## 2020-07-21 DIAGNOSIS — E66.01 MORBID (SEVERE) OBESITY DUE TO EXCESS CALORIES: ICD-10-CM

## 2020-07-21 DIAGNOSIS — Z41.1 ENCOUNTER FOR COSMETIC SURGERY: Chronic | ICD-10-CM

## 2020-07-21 DIAGNOSIS — I48.91 UNSPECIFIED ATRIAL FIBRILLATION: ICD-10-CM

## 2020-07-21 LAB
ANION GAP SERPL CALC-SCNC: 15 MMO/L — HIGH (ref 7–14)
BLD GP AB SCN SERPL QL: NEGATIVE — SIGNIFICANT CHANGE UP
BUN SERPL-MCNC: 19 MG/DL — SIGNIFICANT CHANGE UP (ref 7–23)
CALCIUM SERPL-MCNC: 9.5 MG/DL — SIGNIFICANT CHANGE UP (ref 8.4–10.5)
CHLORIDE SERPL-SCNC: 101 MMOL/L — SIGNIFICANT CHANGE UP (ref 98–107)
CO2 SERPL-SCNC: 24 MMOL/L — SIGNIFICANT CHANGE UP (ref 22–31)
CREAT SERPL-MCNC: 1.03 MG/DL — SIGNIFICANT CHANGE UP (ref 0.5–1.3)
GLUCOSE SERPL-MCNC: 140 MG/DL — HIGH (ref 70–99)
HBA1C BLD-MCNC: 6.1 % — HIGH (ref 4–5.6)
HCT VFR BLD CALC: 34.1 % — LOW (ref 34.5–45)
HGB BLD-MCNC: 10.9 G/DL — LOW (ref 11.5–15.5)
MCHC RBC-ENTMCNC: 32 % — SIGNIFICANT CHANGE UP (ref 32–36)
MCHC RBC-ENTMCNC: 33.2 PG — SIGNIFICANT CHANGE UP (ref 27–34)
MCV RBC AUTO: 104 FL — HIGH (ref 80–100)
NRBC # FLD: 0 K/UL — SIGNIFICANT CHANGE UP (ref 0–0)
PLATELET # BLD AUTO: 262 K/UL — SIGNIFICANT CHANGE UP (ref 150–400)
PMV BLD: 8.8 FL — SIGNIFICANT CHANGE UP (ref 7–13)
POTASSIUM SERPL-MCNC: 4.2 MMOL/L — SIGNIFICANT CHANGE UP (ref 3.5–5.3)
POTASSIUM SERPL-SCNC: 4.2 MMOL/L — SIGNIFICANT CHANGE UP (ref 3.5–5.3)
RBC # BLD: 3.28 M/UL — LOW (ref 3.8–5.2)
RBC # FLD: 19.1 % — HIGH (ref 10.3–14.5)
RH IG SCN BLD-IMP: POSITIVE — SIGNIFICANT CHANGE UP
SODIUM SERPL-SCNC: 140 MMOL/L — SIGNIFICANT CHANGE UP (ref 135–145)
WBC # BLD: 9.79 K/UL — SIGNIFICANT CHANGE UP (ref 3.8–10.5)
WBC # FLD AUTO: 9.79 K/UL — SIGNIFICANT CHANGE UP (ref 3.8–10.5)

## 2020-07-21 PROCEDURE — 93010 ELECTROCARDIOGRAM REPORT: CPT

## 2020-07-21 RX ORDER — SODIUM CHLORIDE 9 MG/ML
3 INJECTION INTRAMUSCULAR; INTRAVENOUS; SUBCUTANEOUS EVERY 8 HOURS
Refills: 0 | Status: DISCONTINUED | OUTPATIENT
Start: 2020-07-31 | End: 2020-08-01

## 2020-07-21 NOTE — H&P PST ADULT - NEGATIVE OPHTHALMOLOGIC SYMPTOMS
no discharge L/no pain L/no irritation R/no discharge R/no irritation L/no lacrimation R/no blurred vision R/no lacrimation L/no photophobia/no blurred vision L/no diplopia no irritation R/no discharge R/no pain L/no loss of vision R/no blurred vision L/no pain R/no irritation L/no loss of vision L/no lacrimation R/no diplopia/no discharge L/no lacrimation L/no photophobia/no blurred vision R

## 2020-07-21 NOTE — H&P PST ADULT - NSICDXPROBLEM_GEN_ALL_CORE_FT
PROBLEM DIAGNOSES  Problem: Malignant neoplasm of central portion of right female breast  Assessment and Plan: Scheduled for right mastectomy right sentinel lymph nodes biopsy on 07/31/20. Pre op instructions, famotidine, chlorhexidine gluconate soap given and explained. Pt verbalized understanding.   Pt has scheduled appt for Covid-19 testing pre op on 07/28/20  Pending medical consultation pre op    Problem: Morbid obesity  Assessment and Plan: BEATA precautions recommended, OR booking notified via fax    Problem: Type 2 diabetes mellitus  Assessment and Plan: Monitor BS on day of surgery. Pt instructed to hold metformin 24 hours prior to surgery. Also, instructed not to take any diabetes medications on day of surgery. Pt verbalized understanding.    Problem: Afib  Assessment and Plan: Telephone call to provider Ann Moreno regarding treatment plan for Eliquis pre op. Awaiting call back. PROBLEM DIAGNOSES  Problem: Afib  Assessment and Plan: Telephone call to provider Ann Moreno regarding treatment plan for Eliquis pre op. Awaiting call back.  Received call back from provider TROY Moreno who recommended that pt hold Eliquis 3 days prior to surgery.     Problem: Type 2 diabetes mellitus  Assessment and Plan: Monitor BS on day of surgery. Pt instructed to hold metformin 24 hours prior to surgery. Also, instructed not to take any diabetes medications on day of surgery. Pt verbalized understanding.    Problem: Morbid obesity  Assessment and Plan: BEATA precautions recommended, OR booking notified via fax    Problem: Malignant neoplasm of central portion of right female breast  Assessment and Plan: Scheduled for right mastectomy right sentinel lymph nodes biopsy on 07/31/20. Pre op instructions, famotidine, chlorhexidine gluconate soap given and explained. Pt verbalized understanding.   Pt has scheduled appt for Covid-19 testing pre op on 07/28/20  Pending medical consultation pre op

## 2020-07-21 NOTE — H&P PST ADULT - OTHER CARE PROVIDERS
Toño Delgado-[facility] [phone],  -ceasar [phone] Toño Delgado-[facility] [phone], Dr. Alonso -ceasar [phone]

## 2020-07-21 NOTE — H&P PST ADULT - CARDIOVASCULAR SYMPTOMS
dyspnea on exertion/Mild, intermittently-with going up flight of stairs/exertion, over the last few months, since development of afib dyspnea on exertion/Mild, intermittently-with going up flight of stairs/exertion

## 2020-07-21 NOTE — H&P PST ADULT - ASSESSMENT
71 y/o female presents with pre op diagnosis: malignant neoplasm of central portion of right female breast

## 2020-07-21 NOTE — H&P PST ADULT - HISTORY OF PRESENT ILLNESS
71 y/o  female with pmhx of HTN, HLD, obesity, T2DM, recent dx of afib, and breast cancer presents today for presurgical testing.      Pt was at routine physical in [facility] and found to have new onset afib and trace of blood in her u/a.  She was put on Eliquis for the afib and after 4 days, developed gross hematuria.  Urological evaluation revealed kidney stone in right kidney and lithotripsy was recommended.  She was instructed to hold Eliquis until after lithotripsy.  Pt was seen in PST on 4/4/16, but procedure was delayed due to pending cardiac evaluation for new onset afib.  Pt presents today after cardiac evaluation for presurgical testing for right ESWL.  Denies chest pain, palpitations, shortness of breath, headache, change in vision, fever, chills, hematuria (none after initial episode), dysuria, and abdominal pain. 69 y/o  female with pmhx of HTN, HLD, obesity, T2DM, BRCA positive, Afib, and breast cancer presents today for presurgical testing  abn.mammo 12/2019, US/ biopsy of right breast . 69 y/o  female with PMHx of HTN, HLD, obesity, T2DM, BRCA positive, Afib, presents to PST for pre op evaluation with hx of  abn. mammography 12/2019, US/ biopsy of right breast. Pre op diagnosis: malignant neoplasm of central portion of right female breast. Scheduled for right mastectomy right sentinel lymph nodes biopsy on 07/31/20

## 2020-07-21 NOTE — H&P PST ADULT - NSANTHOSAYNRD_GEN_A_CORE
No. BEATA screening performed.  STOP BANG Legend: 0-2 = LOW Risk; 3-4 = INTERMEDIATE Risk; 5-8 = HIGH Risk

## 2020-07-21 NOTE — H&P PST ADULT - RS GEN PE MLT RESP DETAILS PC
no wheezes/no rhonchi/good air movement/no chest wall tenderness/no rales/clear to auscultation bilaterally/respirations non-labored/breath sounds equal no wheezes/no rhonchi/no intercostal retractions/respirations non-labored/no rales/no chest wall tenderness/good air movement/clear to auscultation bilaterally/breath sounds equal

## 2020-07-21 NOTE — H&P PST ADULT - NSICDXFAMILYHX_GEN_ALL_CORE_FT
FAMILY HISTORY:  Father  Still living? No  Family history of acute myocardial infarction, Age at diagnosis: Age Unknown  Family history of lung cancer, Age at diagnosis: Age Unknown    Mother  Still living? Unknown  Family history of non-Hodgkin's lymphoma, Age at diagnosis: Age Unknown    Sibling  Still living? No  Family history of diabetes mellitus, Age at diagnosis: Age Unknown

## 2020-07-21 NOTE — H&P PST ADULT - NSICDXPASTMEDICALHX_GEN_ALL_CORE_FT
PAST MEDICAL HISTORY:  Afib new onset 2/24/16 on ASA only    Anxiety and depression     Breast cancer in female s/p L mastectomy, right lumpectomy, radiation 2011    Calculus of right kidney     CRVO (central retinal vein occlusion), right currently receiving care by specialist    Dermoid cyst of ovary, unspecified laterality bilateral    Diabetes     Diabetic retinopathy     Hematuria benigh cytology 2/24/16    History of breast cancer 1994 left mast, chemo  2001 right lumpectomy, RT    History of cholelithiasis 1980    Hyperlipidemia, unspecified hyperlipidemia type     Hypertension     Hypertension     Obesity (BMI 35.0-39.9 without comorbidity)     Osteoporosis     Type 2 diabetes mellitus since 1994, most recent HgA1c 9.8 (2/11/16) PAST MEDICAL HISTORY:  Afib     Anxiety and depression     Breast cancer in female s/p L mastectomy, right lumpectomy, radiation 2011    Calculus of right kidney     CRVO (central retinal vein occlusion), right     Dermoid cyst of ovary, unspecified laterality bilateral    Diabetes     Diabetic retinopathy     Hematuria benigh cytology 2/24/16    History of breast cancer 1994 left mast, chemo  2001 right lumpectomy, RT    History of cholelithiasis 1980    Hyperlipidemia, unspecified hyperlipidemia type     Hypertension     Hypertension     Obesity (BMI 35.0-39.9 without comorbidity)     Osteoporosis     Type 2 diabetes mellitus

## 2020-07-21 NOTE — H&P PST ADULT - NEGATIVE GENERAL GENITOURINARY SYMPTOMS
no urinary hesitancy/normal urinary frequency/no renal colic/no bladder infections/no flank pain R/no flank pain L/no dysuria/no incontinence no urinary hesitancy/normal urinary frequency/no renal colic/no hematuria/no urine discoloration/no flank pain L/no flank pain R/no bladder infections/no dysuria/no incontinence

## 2020-07-21 NOTE — H&P PST ADULT - NEGATIVE NEUROLOGICAL SYMPTOMS
no weakness/no transient paralysis/no vertigo no paresthesias/no generalized seizures/no hemiparesis/no tremors/no loss of consciousness/no weakness/no vertigo/no loss of sensation/no syncope/no transient paralysis

## 2020-07-21 NOTE — H&P PST ADULT - NSICDXPASTSURGICALHX_GEN_ALL_CORE_FT
PAST SURGICAL HISTORY:  H/O rhinoplasty     S/P cataract extraction, unspecified laterality bilateral    S/P cholecystectomy 1980, open    S/P hysterectomy with oophorectomy 2011 for dermoid cyst x 2    S/P left mastectomy 1994    S/P lumpectomy, right breast 2001 PAST SURGICAL HISTORY:  H/O rhinoplasty     History of chemotherapy S/P Medi port insertion ; 02/2020    S/P cataract extraction, unspecified laterality bilateral    S/P cholecystectomy 1980, open    S/P hysterectomy with oophorectomy 2011 for dermoid cyst x 2    S/P left mastectomy 1994    S/P lumpectomy, right breast 2001

## 2020-07-27 DIAGNOSIS — Z01.818 ENCOUNTER FOR OTHER PREPROCEDURAL EXAMINATION: ICD-10-CM

## 2020-07-28 ENCOUNTER — APPOINTMENT (OUTPATIENT)
Dept: DISASTER EMERGENCY | Facility: CLINIC | Age: 70
End: 2020-07-28

## 2020-07-29 LAB — SARS-COV-2 N GENE NPH QL NAA+PROBE: NOT DETECTED

## 2020-07-30 ENCOUNTER — TRANSCRIPTION ENCOUNTER (OUTPATIENT)
Age: 70
End: 2020-07-30

## 2020-07-30 NOTE — ASU PATIENT PROFILE, ADULT - PSH
H/O rhinoplasty    History of chemotherapy  S/P Medi port insertion ; 02/2020  S/P cataract extraction, unspecified laterality  bilateral  S/P cholecystectomy  1980, open  S/P hysterectomy with oophorectomy  2011 for dermoid cyst x 2  S/P left mastectomy  1994  S/P lumpectomy, right breast  2001

## 2020-07-30 NOTE — ASU PATIENT PROFILE, ADULT - PMH
Afib    Anxiety and depression    Breast cancer in female  s/p L mastectomy, right lumpectomy, radiation 2011  Calculus of right kidney    CRVO (central retinal vein occlusion), right    Dermoid cyst of ovary, unspecified laterality  bilateral  Diabetes    Diabetic retinopathy    Hematuria  benigh cytology 2/24/16  History of breast cancer  1994 left mast, chemo  2001 right lumpectomy, RT  History of cholelithiasis  1980  Hyperlipidemia, unspecified hyperlipidemia type    Hypertension    Hypertension    Obesity (BMI 35.0-39.9 without comorbidity)    Osteoporosis    Type 2 diabetes mellitus

## 2020-07-31 ENCOUNTER — RESULT REVIEW (OUTPATIENT)
Age: 70
End: 2020-07-31

## 2020-07-31 ENCOUNTER — INPATIENT (INPATIENT)
Facility: HOSPITAL | Age: 70
LOS: 0 days | Discharge: HOME HEALTH | End: 2020-08-01
Attending: SURGERY | Admitting: SURGERY
Payer: MEDICARE

## 2020-07-31 ENCOUNTER — APPOINTMENT (OUTPATIENT)
Dept: NUCLEAR MEDICINE | Facility: HOSPITAL | Age: 70
End: 2020-07-31

## 2020-07-31 VITALS
HEIGHT: 64 IN | DIASTOLIC BLOOD PRESSURE: 87 MMHG | SYSTOLIC BLOOD PRESSURE: 144 MMHG | OXYGEN SATURATION: 96 % | WEIGHT: 238.98 LBS | TEMPERATURE: 98 F | RESPIRATION RATE: 16 BRPM | HEART RATE: 90 BPM

## 2020-07-31 DIAGNOSIS — Z90.12 ACQUIRED ABSENCE OF LEFT BREAST AND NIPPLE: Chronic | ICD-10-CM

## 2020-07-31 DIAGNOSIS — C50.111 MALIGNANT NEOPLASM OF CENTRAL PORTION OF RIGHT FEMALE BREAST: ICD-10-CM

## 2020-07-31 DIAGNOSIS — Z41.1 ENCOUNTER FOR COSMETIC SURGERY: Chronic | ICD-10-CM

## 2020-07-31 DIAGNOSIS — Z90.710 ACQUIRED ABSENCE OF BOTH CERVIX AND UTERUS: Chronic | ICD-10-CM

## 2020-07-31 DIAGNOSIS — Z98.49 CATARACT EXTRACTION STATUS, UNSPECIFIED EYE: Chronic | ICD-10-CM

## 2020-07-31 DIAGNOSIS — Z98.89 OTHER SPECIFIED POSTPROCEDURAL STATES: Chronic | ICD-10-CM

## 2020-07-31 DIAGNOSIS — Z92.21 PERSONAL HISTORY OF ANTINEOPLASTIC CHEMOTHERAPY: Chronic | ICD-10-CM

## 2020-07-31 DIAGNOSIS — Z90.49 ACQUIRED ABSENCE OF OTHER SPECIFIED PARTS OF DIGESTIVE TRACT: Chronic | ICD-10-CM

## 2020-07-31 LAB
GLUCOSE BLDC GLUCOMTR-MCNC: 145 MG/DL — HIGH (ref 70–99)
GLUCOSE BLDC GLUCOMTR-MCNC: 239 MG/DL — HIGH (ref 70–99)
GLUCOSE BLDC GLUCOMTR-MCNC: 328 MG/DL — HIGH (ref 70–99)
GLUCOSE BLDC GLUCOMTR-MCNC: 331 MG/DL — HIGH (ref 70–99)

## 2020-07-31 PROCEDURE — 88307 TISSUE EXAM BY PATHOLOGIST: CPT | Mod: 26

## 2020-07-31 PROCEDURE — 88342 IMHCHEM/IMCYTCHM 1ST ANTB: CPT | Mod: 26

## 2020-07-31 PROCEDURE — 88305 TISSUE EXAM BY PATHOLOGIST: CPT | Mod: 26

## 2020-07-31 RX ORDER — ENOXAPARIN SODIUM 100 MG/ML
40 INJECTION SUBCUTANEOUS DAILY
Refills: 0 | Status: DISCONTINUED | OUTPATIENT
Start: 2020-07-31 | End: 2020-08-01

## 2020-07-31 RX ORDER — OXYCODONE AND ACETAMINOPHEN 5; 325 MG/1; MG/1
2 TABLET ORAL EVERY 4 HOURS
Refills: 0 | Status: DISCONTINUED | OUTPATIENT
Start: 2020-07-31 | End: 2020-07-31

## 2020-07-31 RX ORDER — LOSARTAN POTASSIUM 100 MG/1
1 TABLET, FILM COATED ORAL
Qty: 0 | Refills: 0 | DISCHARGE

## 2020-07-31 RX ORDER — ONDANSETRON 8 MG/1
4 TABLET, FILM COATED ORAL ONCE
Refills: 0 | Status: DISCONTINUED | OUTPATIENT
Start: 2020-07-31 | End: 2020-07-31

## 2020-07-31 RX ORDER — AMLODIPINE BESYLATE 2.5 MG/1
1 TABLET ORAL
Qty: 0 | Refills: 0 | DISCHARGE

## 2020-07-31 RX ORDER — SERTRALINE 25 MG/1
1 TABLET, FILM COATED ORAL
Qty: 0 | Refills: 0 | DISCHARGE

## 2020-07-31 RX ORDER — FENTANYL CITRATE 50 UG/ML
50 INJECTION INTRAVENOUS
Refills: 0 | Status: DISCONTINUED | OUTPATIENT
Start: 2020-07-31 | End: 2020-07-31

## 2020-07-31 RX ORDER — HUMAN INSULIN 100 [IU]/ML
20 INJECTION, SUSPENSION SUBCUTANEOUS
Qty: 0 | Refills: 0 | DISCHARGE

## 2020-07-31 RX ORDER — OXYCODONE HYDROCHLORIDE 5 MG/1
10 TABLET ORAL EVERY 6 HOURS
Refills: 0 | Status: DISCONTINUED | OUTPATIENT
Start: 2020-07-31 | End: 2020-08-01

## 2020-07-31 RX ORDER — BISOPROLOL FUMARATE AND HYDROCHLOROTHIAZIDE 5; 6.25 MG/1; MG/1
1 TABLET ORAL
Qty: 0 | Refills: 0 | DISCHARGE

## 2020-07-31 RX ORDER — SODIUM CHLORIDE 9 MG/ML
1000 INJECTION, SOLUTION INTRAVENOUS
Refills: 0 | Status: DISCONTINUED | OUTPATIENT
Start: 2020-07-31 | End: 2020-07-31

## 2020-07-31 RX ORDER — ACETAMINOPHEN 500 MG
650 TABLET ORAL EVERY 6 HOURS
Refills: 0 | Status: DISCONTINUED | OUTPATIENT
Start: 2020-07-31 | End: 2020-08-01

## 2020-07-31 RX ORDER — ATORVASTATIN CALCIUM 80 MG/1
1 TABLET, FILM COATED ORAL
Qty: 0 | Refills: 0 | DISCHARGE

## 2020-07-31 RX ORDER — OXYCODONE HYDROCHLORIDE 5 MG/1
5 TABLET ORAL EVERY 4 HOURS
Refills: 0 | Status: DISCONTINUED | OUTPATIENT
Start: 2020-07-31 | End: 2020-08-01

## 2020-07-31 RX ORDER — ERGOCALCIFEROL 1.25 MG/1
1 CAPSULE ORAL
Qty: 0 | Refills: 0 | DISCHARGE

## 2020-07-31 RX ORDER — CEPHALEXIN 500 MG
250 CAPSULE ORAL EVERY 6 HOURS
Refills: 0 | Status: DISCONTINUED | OUTPATIENT
Start: 2020-07-31 | End: 2020-08-01

## 2020-07-31 RX ORDER — SITAGLIPTIN 50 MG/1
1 TABLET, FILM COATED ORAL
Qty: 0 | Refills: 0 | DISCHARGE

## 2020-07-31 RX ORDER — METFORMIN HYDROCHLORIDE 850 MG/1
1 TABLET ORAL
Qty: 0 | Refills: 0 | DISCHARGE

## 2020-07-31 RX ORDER — INSULIN LISPRO 100/ML
VIAL (ML) SUBCUTANEOUS
Refills: 0 | Status: DISCONTINUED | OUTPATIENT
Start: 2020-07-31 | End: 2020-08-01

## 2020-07-31 RX ORDER — APIXABAN 2.5 MG/1
1 TABLET, FILM COATED ORAL
Qty: 0 | Refills: 0 | DISCHARGE

## 2020-07-31 RX ORDER — CEPHALEXIN 500 MG
1 CAPSULE ORAL
Qty: 20 | Refills: 0
Start: 2020-07-31

## 2020-07-31 RX ADMIN — SODIUM CHLORIDE 3 MILLILITER(S): 9 INJECTION INTRAMUSCULAR; INTRAVENOUS; SUBCUTANEOUS at 22:20

## 2020-07-31 RX ADMIN — SODIUM CHLORIDE 3 MILLILITER(S): 9 INJECTION INTRAMUSCULAR; INTRAVENOUS; SUBCUTANEOUS at 13:34

## 2020-07-31 RX ADMIN — Medication 4: at 17:20

## 2020-07-31 RX ADMIN — ENOXAPARIN SODIUM 40 MILLIGRAM(S): 100 INJECTION SUBCUTANEOUS at 12:27

## 2020-07-31 RX ADMIN — Medication 250 MILLIGRAM(S): at 18:15

## 2020-07-31 NOTE — CHART NOTE - NSCHARTNOTEFT_GEN_A_CORE
70 F POD0 from R total mastectomy with ALND for malignancy of R breast.    Subjective : Patient is feeling well. She is tolerating reg diet. She is ambulating. Pain is well controlled. She is producing urine. No nausea, vomiting, CP, or SOB.    Objective:   Exam:  General: sitting upright in bed in NAD  Neuro: AOx3  Heart: RRR  Lungs: ctab  Breast: L breast previous surgical scar. R breast, new surgical wound with dressing CDI, DARRELL drains producing serosanguineous fluid.    Vital Signs Last 24 Hrs  T(C): 36.8 (31 Jul 2020 17:43), Max: 36.8 (31 Jul 2020 17:43)  T(F): 98.3 (31 Jul 2020 17:43), Max: 98.3 (31 Jul 2020 17:43)  HR: 100 (31 Jul 2020 17:43) (79 - 100)  BP: 120/54 (31 Jul 2020 17:43) (112/81 - 163/88)  BP(mean): 87 (31 Jul 2020 11:30) (84 - 106)  RR: 17 (31 Jul 2020 17:43) (13 - 17)  SpO2: 97% (31 Jul 2020 17:43) (93% - 100%)    I&O's Detail    31 Jul 2020 07:01  -  31 Jul 2020 19:42  --------------------------------------------------------  IN:    lactated ringers.: 30 mL  Total IN: 30 mL    OUT:    Bulb: 20 mL    Voided: 100 mL  Total OUT: 120 mL    Total NET: -90 mL      MEDICATIONS  (STANDING):  cephalexin 250 milliGRAM(s) Oral every 6 hours  enoxaparin Injectable 40 milliGRAM(s) SubCutaneous daily  insulin lispro (HumaLOG) corrective regimen sliding scale   SubCutaneous three times a day before meals  sodium chloride 0.9% lock flush 3 milliLiter(s) IV Push every 8 hours    MEDICATIONS  (PRN):  acetaminophen   Tablet .. 650 milliGRAM(s) Oral every 6 hours PRN Mild Pain (1 - 3)  oxyCODONE    IR 5 milliGRAM(s) Oral every 4 hours PRN Moderate Pain (4 - 6)  oxyCODONE    IR 10 milliGRAM(s) Oral every 6 hours PRN Severe Pain (7 - 10)                A/P:  70 F on POD 0 from right breast total mastectomy with ALND for breast malignancy. Improving well.  - monitor FS glucose, continue with ISS  - Reg diet  - d/c IVF  - f/u AM labs  - ambulate as tolerated  - pain control    D Team: [id number]

## 2020-07-31 NOTE — CHART NOTE - NSCHARTNOTEFT_GEN_A_CORE
CAPRINI SCORE     AGE RELATED RISK FACTORS                                                       MOBILITY RELATED FACTORS  [ ] Age 41-60 years                                            (1 Point)                  [ ] Bed rest                                                        (1 Point)  [x] Age: 61-74 years                                           (2 Points)                 [ ] Plaster cast                                                   (2 Points)  [ ] Age= 75 years                                              (3 Points)                 [ ] Bed bound for more than 72 hours                 (2 Points)    DISEASE RELATED RISK FACTORS                                               GENDER SPECIFIC FACTORS  [ ] Edema in the lower extremities                       (1 Point)                  [ ] Pregnancy                                                     (1 Point)  [ ] Varicose veins                                               (1 Point)                  [ ] Post-partum < 6 weeks                                   (1 Point)             [x] BMI > 25 Kg/m2                                            (1 Point)                  [ ] Hormonal therapy  or oral contraception          (1 Point)                 [ ] Sepsis (in the previous month)                        (1 Point)                  [ ] History of pregnancy complications                 (1 point)  [ ] Pneumonia or serious lung disease                                               [ ] Unexplained or recurrent                     (1 Point)           (in the previous month)                               (1 Point)  [ ] Abnormal pulmonary function test                     (1 Point)                 SURGERY RELATED RISK FACTORS  [ ] Acute myocardial infarction                              (1 Point)                 [ ]  Section                                             (1 Point)  [ ] Congestive heart failure (in the previous month)  (1 Point)               [ ] Minor surgery                                                  (1 Point)   [ ] Inflammatory bowel disease                             (1 Point)                 [ ] Arthroscopic surgery                                        (2 Points)  [ ] Central venous access                                      (2 Points)                [x ] General surgery lasting more than 45 minutes   (2 Points)       [ ] Stroke (in the previous month)                          (5 Points)               [ ] Elective arthroplasty                                         (5 Points)            [x] Present or previous malignancy                        (2 points)                                                                                                                                      HEMATOLOGY RELATED FACTORS                                                 TRAUMA RELATED RISK FACTORS  [ ] Prior episodes of VTE                                     (3 Points)                [ ] Fracture of the hip, pelvis, or leg                       (5 Points)  [ ] Positive family history for VTE                         (3 Points)                 [ ] Acute spinal cord injury (in the previous month)  (5 Points)  [ ] Prothrombin 70029 A                                     (3 Points)                 [ ] Paralysis  (less than 1 month)                             (5 Points)  [ ] Factor V Leiden                                             (3 Points)                  [ ] Multiple Trauma within 1 month                        (5 Points)  [ ] Lupus anticoagulants                                     (3 Points)                                                           [ ] Anticardiolipin antibodies                               (3 Points)                                                       [ ] High homocysteine in the blood                      (3 Points)                                             [ ] Other congenital or acquired thrombophilia      (3 Points)                                                [ ] Heparin induced thrombocytopenia                  (3 Points)                                          Total Score [ 7 ]    Caprini Score 0 - 2:  Low Risk, No VTE Prophylaxis required for most patients, encourage ambulation  Caprini Score 3 - 6:  At Risk, pharmacologic VTE prophylaxis is indicated for most patients (in the absence of a contraindication)  Caprini Score Greater than or = 7:  High Risk, pharmacologic VTE prophylaxis is indicated for most patients (in the absence of a contraindication)

## 2020-07-31 NOTE — BRIEF OPERATIVE NOTE - OPERATION/FINDINGS
Right total mastectomy with right sentinel lymph node biopsy sent for pathology. Plastics is closing with Dr Jiménez.
Closure of mastectomy incision following right mastectomy with sentinel node biopsy by Dr. Del Rio. No reconstruction, 2x10Fr DARRELL drains placed. Incision closed with sutures and dermabond prineo tape. Dressed with fluffs, foam tape, and placed in a surgical bra.

## 2020-07-31 NOTE — BRIEF OPERATIVE NOTE - NSICDXBRIEFPREOP_GEN_ALL_CORE_FT
PRE-OP DIAGNOSIS:  Breast cancer 31-Jul-2020 09:25:33  Henrik Colindres
PRE-OP DIAGNOSIS:  Breast cancer 31-Jul-2020 09:25:33  Henrik Colindres

## 2020-07-31 NOTE — BRIEF OPERATIVE NOTE - NSICDXBRIEFPROCEDURE_GEN_ALL_CORE_FT
PROCEDURES:  Right mastectomy with axillary sentinel lymph node biopsy 31-Jul-2020 09:23:18  Henrik Colindres
PROCEDURES:  Closure, surgical wound, chest 31-Jul-2020 10:44:24  Christiano Parsons

## 2020-07-31 NOTE — ASU PREOP CHECKLIST - SKIN PREP
*ATTENTION:  This note has been created by a medical student for educational purposes only. Please do not refer to the content of this note for clinical decision-making, billing, or other purposes. Please see attending physicians note to obtain clinical information on this patient. *         Medical student Progress Note  [facility]       Patient: Maryanne Don MRN: [id number]  CSN: [id number]    YOB: 1951  Age: 61 y.o. Sex: male    DOA: 1/24/2018 LOS:  LOS: 2 days                    Subjective:     Acute events: Difficult to arouse the patient. Patient did not know where he was and was not able to follow commands. Patient kept drifting back to sleep. Patient has had episode overnight where he pulled out NG tube and colostomy bag and was unable to be controlled. Was put on restraint.         ROS    Objective:      Patient Vitals for the past 24 hrs:   Temp Pulse Resp BP SpO2   01/26/18 0810 97.7 °F (36.5 °C) 88 25 148/82 98 %   01/26/18 0729 97.5 °F (36.4 °C) 67 22 135/67 95 %   01/26/18 0432 96.9 °F (36.1 °C) 89 20 113/57 100 %   01/26/18 0007 97.3 °F (36.3 °C) 67 18 136/80 97 %   01/25/18 2228 98.1 °F (36.7 °C) 85 18 155/81 97 %   01/25/18 2052 - 87 18 - 95 %   01/25/18 2049 - 100 16 - 94 %   01/25/18 2046 - 84 20 152/88 95 %   01/25/18 2042 - 77 15 - 96 %   01/25/18 2036 - 91 17 158/79 98 %   01/25/18 2026 - 81 16 159/80 98 %   01/25/18 2020 - 82 19 - 97 %   01/25/18 2016 - 79 18 159/79 97 %   01/25/18 2013 - 72 18 - 99 %   01/25/18 2010 - 78 12 164/75 97 %   01/25/18 2007 - 77 20 162/75 97 %   01/25/18 2003 - 76 19 - 97 %   01/25/18 1957 - 76 14 159/74 98 %   01/1953 - 76 20 - 98 %   01/25/18 1952 98 °F (36.7 °C) 75 19 170/78 100 %   01/25/18 1947 - 77 17 170/78 100 %   01/25/18 1946 - 77 16 - 100 %   01/25/18 1945 - - - - 99 %   01/25/18 1557 97.1 °F (36.2 °C) 89 18 122/73 97 %   01/25/18 1220 97.1 °F (36.2 °C) 89 18 123/79 96 %         Intake/Output Summary (Last 24 hours) at 01/26/18 0854  Last data filed at 01/26/18 0647   Gross per 24 hour   Intake              700 ml   Output             2135 ml   Net            -1435 ml       Physical Exam:  General:  Patient difficult to arouse and unresponsive at times. Patient in no acute distress  CV:  RRR, no murmurs. No visible pulsations or thrills. ABD:  Soft, nondistended. Colostomy bag in place. No erythema or discharge from surgical site. Neuro:  Patient difficult to arouse and unable to follow commands. Lab/Data Reviewed:  Results for Lo Plummer (MRN [id number]) as of 1/26/2018 08:57   Ref. Range 1/26/2018 04:25   WBC Latest Ref Range: 4.6 - 13.2 K/uL 10.5   RBC Latest Ref Range: 4.70 - 5.50 M/uL 4.46 (L)   HGB Latest Ref Range: 13.0 - 16.0 g/dL 14.8   HCT Latest Ref Range: 36.0 - 48.0 % 43.2   MCV Latest Ref Range: 74.0 - 97.0 FL 96.9   MCH Latest Ref Range: 24.0 - 34.0 PG 33.2   MCHC Latest Ref Range: 31.0 - 37.0 g/dL 34.3   RDW Latest Ref Range: 11.6 - 14.5 % 12.9   PLATELET Latest Ref Range: 135 - 420 K/uL 261   MPV Latest Ref Range: 9.2 - 11.8 FL 9.9   NEUTROPHILS Latest Ref Range: 40 - 73 % 87 (H)   LYMPHOCYTES Latest Ref Range: 21 - 52 % 5 (L)   MONOCYTES Latest Ref Range: 3 - 10 % 8   EOSINOPHILS Latest Ref Range: 0 - 5 % 0   BASOPHILS Latest Ref Range: 0 - 2 % 0   DF Latest Units:   AUTOMATED   ABS. NEUTROPHILS Latest Ref Range: 1.8 - 8.0 K/UL 9.1 (H)   ABS. LYMPHOCYTES Latest Ref Range: 0.9 - 3.6 K/UL 0.6 (L)   ABS. MONOCYTES Latest Ref Range: 0.05 - 1.2 K/UL 0.8   ABS. EOSINOPHILS Latest Ref Range: 0.0 - 0.4 K/UL 0.0   ABS. BASOPHILS Latest Ref Range: 0.0 - 0.1 K/UL 0.0     Results for Lo Plummer (MRN [id number]) as of 1/26/2018 08:57   Ref.  Range 1/26/2018 07:36   Sodium Latest Ref Range: 136 - 145 mmol/L 150 (H)   Potassium Latest Ref Range: 3.5 - 5.5 mmol/L 3.1 (L)   Chloride Latest Ref Range: 100 - 108 mmol/L 111 (H)   CO2 Latest Ref Range: 21 - 32 mmol/L 33 (H)   Anion gap Latest Ref Range: 3.0 - 18 mmol/L 6   Glucose Latest Ref Range: 74 - 99 mg/dL 130 (H)   BUN Latest Ref Range: 7.0 - 18 MG/DL 23 (H)   Creatinine Latest Ref Range: 0.6 - 1.3 MG/DL 0.72   BUN/Creatinine ratio Latest Ref Range: 12 - 20   32 (H)   Calcium Latest Ref Range: 8.5 - 10.1 MG/DL 8.1 (L)   GFR est non-AA Latest Ref Range: >60 ml/min/1.73m2 >60   GFR est AA Latest Ref Range: >60 ml/min/1.73m2 >60     Scheduled Medications Reviewed:  Current Facility-Administered Medications   Medication Dose Route Frequency    HYDROmorphone (PF) (DILAUDID) 30 mg / 30 mL PCA   IntraVENous TITRATE    lactated Ringers infusion  100 mL/hr IntraVENous CONTINUOUS    ketorolac (TORADOL) injection 15 mg  15 mg IntraVENous Q8H    ampicillin-sulbactam (UNASYN) 3 g in 0.9% sodium chloride (MBP/ADV) 100 mL MBP  3 g IntraVENous Q6H    HYDROmorphone (PF) (DILAUDID) 1 mg/mL injection        enoxaparin (LOVENOX) injection 40 mg  40 mg SubCUTAneous Q24H    nicotine (NICODERM CQ) 14 mg/24 hr patch 1 Patch  1 Patch TransDERmal DAILY    sodium chloride (NS) flush 5-10 mL  5-10 mL IntraVENous Q8H             Assessment/Plan     61 y. o. male with PMH of malignant bladder cancer s/p TURBT and chemo and radiaiton therapy, chronic constipation, urinary incontinence, T2DM, neuropathy, nicotine dependence and alcohol abuse, now admitted with large bowel obstruction s/p loop descending colostomy.      1. Colonic obstruction: This is likely due to rectal thickening as seen by colonoscopy in 1/8 and as well as medications such as fentanyl and hydrocodone that reduced his GI function. Ellen Austin has had abdominal pain and obstructive symptoms for a week.  It is possible that bladder cancer has metastasized to rectosigmoid junction and caused obstruction, but it is unlikely from colonoscopy result few weeks back.  Patient also showed low potassium levels, likely due to dehydration. Patient has had gastrograffin test and has had loop descending colostomy.                           -Continue pain control via PCA pump             - Continue LR @ 100mL/hr            -advance diet as tolerated                       - followed by general surgery      2. Bladder Cancer [facility]): Patient has history of bladder cancer and is followed by medical oncologist and urological oncologist.  Increasing adenopathy and lytic lesions in skeletal structures noted in CT scan likely due to metastasis                            -Consult Urology, Heme/Onc, Palliative Care                        -Hold Fentanyl patch and oxycodone      3.  Alcohol withdrawal: Patient likely has alcohol withdrawal symptoms given his history of drinking.     - Restrain for now    - Ativan as needed                      4. Peripheral Neuropathy from chemo                        -Continue gabapentin PO when tolerated       Diet: NPO      [address] [facility] MS[facility]  01/26/18 8:54 AM done

## 2020-08-01 ENCOUNTER — TRANSCRIPTION ENCOUNTER (OUTPATIENT)
Age: 70
End: 2020-08-01

## 2020-08-01 VITALS
SYSTOLIC BLOOD PRESSURE: 114 MMHG | RESPIRATION RATE: 18 BRPM | OXYGEN SATURATION: 95 % | TEMPERATURE: 98 F | HEART RATE: 84 BPM | DIASTOLIC BLOOD PRESSURE: 71 MMHG

## 2020-08-01 LAB
ANION GAP SERPL CALC-SCNC: 11 MMO/L — SIGNIFICANT CHANGE UP (ref 7–14)
BUN SERPL-MCNC: 23 MG/DL — SIGNIFICANT CHANGE UP (ref 7–23)
CALCIUM SERPL-MCNC: 8.4 MG/DL — SIGNIFICANT CHANGE UP (ref 8.4–10.5)
CHLORIDE SERPL-SCNC: 98 MMOL/L — SIGNIFICANT CHANGE UP (ref 98–107)
CO2 SERPL-SCNC: 27 MMOL/L — SIGNIFICANT CHANGE UP (ref 22–31)
CREAT SERPL-MCNC: 1.06 MG/DL — SIGNIFICANT CHANGE UP (ref 0.5–1.3)
GLUCOSE BLDC GLUCOMTR-MCNC: 184 MG/DL — HIGH (ref 70–99)
GLUCOSE BLDC GLUCOMTR-MCNC: 201 MG/DL — HIGH (ref 70–99)
GLUCOSE BLDC GLUCOMTR-MCNC: 313 MG/DL — HIGH (ref 70–99)
GLUCOSE BLDC GLUCOMTR-MCNC: 322 MG/DL — HIGH (ref 70–99)
GLUCOSE SERPL-MCNC: 239 MG/DL — HIGH (ref 70–99)
HCT VFR BLD CALC: 30.9 % — LOW (ref 34.5–45)
HGB BLD-MCNC: 9.7 G/DL — LOW (ref 11.5–15.5)
MAGNESIUM SERPL-MCNC: 1.2 MG/DL — LOW (ref 1.6–2.6)
MCHC RBC-ENTMCNC: 31.4 % — LOW (ref 32–36)
MCHC RBC-ENTMCNC: 33.4 PG — SIGNIFICANT CHANGE UP (ref 27–34)
MCV RBC AUTO: 106.6 FL — HIGH (ref 80–100)
NRBC # FLD: 0 K/UL — SIGNIFICANT CHANGE UP (ref 0–0)
PHOSPHATE SERPL-MCNC: 3.4 MG/DL — SIGNIFICANT CHANGE UP (ref 2.5–4.5)
PLATELET # BLD AUTO: 132 K/UL — LOW (ref 150–400)
PMV BLD: 8.6 FL — SIGNIFICANT CHANGE UP (ref 7–13)
POTASSIUM SERPL-MCNC: 5.2 MMOL/L — SIGNIFICANT CHANGE UP (ref 3.5–5.3)
POTASSIUM SERPL-SCNC: 5.2 MMOL/L — SIGNIFICANT CHANGE UP (ref 3.5–5.3)
RBC # BLD: 2.9 M/UL — LOW (ref 3.8–5.2)
RBC # FLD: 17.3 % — HIGH (ref 10.3–14.5)
SODIUM SERPL-SCNC: 136 MMOL/L — SIGNIFICANT CHANGE UP (ref 135–145)
WBC # BLD: 10.76 K/UL — HIGH (ref 3.8–10.5)
WBC # FLD AUTO: 10.76 K/UL — HIGH (ref 3.8–10.5)

## 2020-08-01 RX ORDER — INSULIN LISPRO 100/ML
8 VIAL (ML) SUBCUTANEOUS ONCE
Refills: 0 | Status: COMPLETED | OUTPATIENT
Start: 2020-08-01 | End: 2020-08-01

## 2020-08-01 RX ORDER — CEPHALEXIN 500 MG
1 CAPSULE ORAL
Qty: 16 | Refills: 0
Start: 2020-08-01 | End: 2020-08-04

## 2020-08-01 RX ORDER — CEPHALEXIN 500 MG
1 CAPSULE ORAL
Qty: 0 | Refills: 0 | DISCHARGE
Start: 2020-08-01

## 2020-08-01 RX ORDER — INSULIN LISPRO 100/ML
VIAL (ML) SUBCUTANEOUS
Refills: 0 | Status: DISCONTINUED | OUTPATIENT
Start: 2020-08-01 | End: 2020-08-01

## 2020-08-01 RX ADMIN — Medication 250 MILLIGRAM(S): at 11:30

## 2020-08-01 RX ADMIN — SODIUM CHLORIDE 3 MILLILITER(S): 9 INJECTION INTRAMUSCULAR; INTRAVENOUS; SUBCUTANEOUS at 06:08

## 2020-08-01 RX ADMIN — Medication 250 MILLIGRAM(S): at 06:08

## 2020-08-01 RX ADMIN — Medication 250 MILLIGRAM(S): at 01:19

## 2020-08-01 RX ADMIN — Medication 8 UNIT(S): at 01:18

## 2020-08-01 RX ADMIN — Medication 2: at 08:10

## 2020-08-01 NOTE — PROGRESS NOTE ADULT - SUBJECTIVE AND OBJECTIVE BOX
Plastic Surgery Progress Note    Subjective: seen on rounds, no issues this morning, pain controlled    Exam:    Neuro: Alert  GA: well-appearing  Pulm: breathing comfortably  Chest: breast incision/dressing c/d/i, drains with s/s output, soft and flat. Bra in place    Vital Signs Last 24 Hrs  T(C): 36.7 (01 Aug 2020 06:06), Max: 36.8 (31 Jul 2020 17:43)  T(F): 98.1 (01 Aug 2020 06:06), Max: 98.3 (31 Jul 2020 17:43)  HR: 81 (01 Aug 2020 06:06) (79 - 100)  BP: 117/81 (01 Aug 2020 06:06) (112/81 - 163/88)  BP(mean): 87 (31 Jul 2020 11:30) (84 - 106)  RR: 18 (01 Aug 2020 06:06) (13 - 18)  SpO2: 97% (01 Aug 2020 06:06) (93% - 100%)    I&O's Detail    31 Jul 2020 07:01  -  01 Aug 2020 07:00  --------------------------------------------------------  IN:    lactated ringers.: 30 mL    Oral Fluid: 240 mL  Total IN: 270 mL    OUT:    Bulb: 5 mL    Bulb: 45 mL    Voided: 800 mL  Total OUT: 850 mL    Total NET: -580 mL      MEDICATIONS  (STANDING):  cephalexin 250 milliGRAM(s) Oral every 6 hours  enoxaparin Injectable 40 milliGRAM(s) SubCutaneous daily  insulin lispro (HumaLOG) corrective regimen sliding scale   SubCutaneous Before meals and at bedtime  sodium chloride 0.9% lock flush 3 milliLiter(s) IV Push every 8 hours    MEDICATIONS  (PRN):  acetaminophen   Tablet .. 650 milliGRAM(s) Oral every 6 hours PRN Mild Pain (1 - 3)  oxyCODONE    IR 5 milliGRAM(s) Oral every 4 hours PRN Moderate Pain (4 - 6)  oxyCODONE    IR 10 milliGRAM(s) Oral every 6 hours PRN Severe Pain (7 - 10)      LABS:                        9.7    10.76 )-----------( 132      ( 01 Aug 2020 05:30 )             30.9     08-01    136  |  98  |  23  ----------------------------<  239<H>  5.2   |  27  |  1.06    Ca    8.4      01 Aug 2020 05:30  Phos  3.4     08-01  Mg     1.2     08-01

## 2020-08-01 NOTE — PROGRESS NOTE ADULT - ASSESSMENT
Pt is a 71 yo F s/p R mastectomy and SLNB with plastics closure on 7/31, since doing well     - okay for discharge per PRS  - should continue surgical bra and drains  - pain meds on discharge  - appreciate care per primary    Plastic Surgery

## 2020-08-01 NOTE — DISCHARGE NOTE PROVIDER - NSDCFUADDINST_GEN_ALL_CORE_FT
Resume normal diet. Avoid straining, exercise, or heavy lifting.    Take medications as instructed by prescriptions. Do not drive while taking narcotic pain medication.    You will be discharged with DARRELL drains. You will need to empty them and record outputs accurately. This will be taught to you by the nursing staff. Please do not remove the DARRELL drains. They will be removed in the office. Please bring to the office accurate records of output.     24-48 hrs after surgery, it's OK to shower/rinse with warm/soapy water, pat dry (NO scrubbing or rubbing).    You have a transparent/purple liquid dressing (called Dermabond) over your surgical incisions called. Do NOT remove the Dermabond. IN a few days, the Dermabond will fall off (or peel off) on its own.    Do not raise arms above head.    Follow-up with primary care doctor as well.     Call 911 and return to the ED for chest pain, shortness of breath, significant increase in pain, or significant change in color of surgical sites. Resume normal diet. Avoid straining, exercise, or heavy lifting.    Take medications as instructed by prescriptions. Do not drive while taking narcotic pain medication.    You may restart your Eliquis tomorrow.    You will be discharged with DARRELL drains. You will need to empty them and record outputs accurately. This will be taught to you by the nursing staff. Please do not remove the DARRELL drains. They will be removed in the office. Please bring to the office accurate records of output.     24-48 hrs after surgery, it's OK to shower/rinse with warm/soapy water, pat dry (NO scrubbing or rubbing).    You have a transparent/purple liquid dressing (called Dermabond) over your surgical incisions called. Do NOT remove the Dermabond. IN a few days, the Dermabond will fall off (or peel off) on its own.    Do not raise arms above head.    Follow-up with primary care doctor as well.     Call 911 and return to the ED for chest pain, shortness of breath, significant increase in pain, or significant change in color of surgical sites.

## 2020-08-01 NOTE — PROGRESS NOTE ADULT - ASSESSMENT
LS is 70F with PMH HTN, HLD, T2DM, BRCA+, presenting for scheduled mastectomy, now s/p R total mastectomy with R SLN bx and plastics closure, clinically stable.     - Restart eliquis per plastics recommendations   - Drain care education   - Home today LS is 70F with PMH HTN, HLD, T2DM, BRCA+, presenting for scheduled mastectomy, now s/p R total mastectomy with R SLN bx and plastics closure, clinically stable.     PLAN:  - Pain: acetaminophen, oxyocodone PRN   - ID: keflex 250mg q6h  - Diet: regular; consistent carbohydrates; ISS pending discharge   - Discharge planning:  - Restart eliquis per plastics recommendations   - Drain care education   - Home today LS is 70F with PMH HTN, HLD, T2DM, BRCA+, presenting for scheduled mastectomy, now s/p R total mastectomy with R SLN bx and plastics closure, clinically stable.     PLAN:  - Pain: acetaminophen, oxyocodone PRN   - ID: keflex 250mg q6h  - Diet: regular; consistent carbohydrates; ISS pending discharge   - Activity: OOB, encourage ambulation  - Discharge planning:  - Restart eliquis per plastics recommendations   - Drain care education   - Home today LS is 70F with PMH HTN, HLD, T2DM, BRCA+, presenting for scheduled mastectomy, now s/p R total mastectomy with R SLN bx and plastics closure, clinically stable.     PLAN:  - Pain: acetaminophen, oxyocodone PRN   - Ok to restart eliquis per plastics recs  - ID: keflex 250mg q6h  - Diet: regular; consistent carbohydrates; ISS pending discharge   - Activity: OOB, encourage ambulation  - Discharge planning:  - Drain care education   - Home today LS is 70F with PMH HTN, HLD, T2DM, BRCA+, presenting for scheduled mastectomy, now s/p R total mastectomy with R SLN bx and plastics closure, clinically stable.     PLAN:  - Pain: acetaminophen, oxyocodone PRN   - Ok to restart eliquis tomorrow per plastics recs  - ID: keflex 250mg q6h  - Diet: regular; consistent carbohydrates; ISS pending discharge   - Activity: OOB, encourage ambulation  - Discharge planning:  - Drain care education   - Home today

## 2020-08-01 NOTE — DISCHARGE NOTE PROVIDER - NSDCMRMEDTOKEN_GEN_ALL_CORE_FT
amLODIPine 10 mg oral tablet: 1 tab(s) orally once a day  atorvastatin 10 mg oral tablet: 1 tab(s) orally once a day  bisoprolol-hydrochlorothiazide 10 mg-6.25 mg oral tablet: 1 tab(s) orally once a day  cephalexin 250 mg oral capsule: 1 cap(s) orally every 6 hours MDD:4  Eliquis 5 mg oral tablet: 1 tab(s) orally 2 times a day; last dose 07/28/20  hydrocodone-acetaminophen 7.5 mg-300 mg oral tablet: 1 tab(s) orally every 4 hours -for moderate pain MDD:6   Januvia 100 mg oral tablet: 1 tab(s) orally once a day  losartan 100 mg oral tablet: 1 tab(s) orally once a day  metFORMIN 1000 mg oral tablet: 1 tab(s) orally 2 times a day  NovoLIN N 100 units/mL subcutaneous suspension: 20-25 unit(s) subcutaneous 2 times a day  sertraline 50 mg oral tablet: 1 tab(s) orally once a day  Vitamin D2 50,000 intl units (1.25 mg) oral capsule: 1 cap(s) orally once a week amLODIPine 10 mg oral tablet: 1 tab(s) orally once a day  atorvastatin 10 mg oral tablet: 1 tab(s) orally once a day  bisoprolol-hydrochlorothiazide 10 mg-6.25 mg oral tablet: 1 tab(s) orally once a day  cephalexin 250 mg oral capsule: 1 cap(s) orally every 6 hours MDD:4  cephalexin 250 mg oral capsule: 1 cap(s) orally every 6 hours  Eliquis 5 mg oral tablet: 1 tab(s) orally 2 times a day; last dose 07/28/20  Januvia 100 mg oral tablet: 1 tab(s) orally once a day  losartan 100 mg oral tablet: 1 tab(s) orally once a day  metFORMIN 1000 mg oral tablet: 1 tab(s) orally 2 times a day  NovoLIN N 100 units/mL subcutaneous suspension: 20-25 unit(s) subcutaneous 2 times a day  sertraline 50 mg oral tablet: 1 tab(s) orally once a day  Vitamin D2 50,000 intl units (1.25 mg) oral capsule: 1 cap(s) orally once a week amLODIPine 10 mg oral tablet: 1 tab(s) orally once a day  atorvastatin 10 mg oral tablet: 1 tab(s) orally once a day  bisoprolol-hydrochlorothiazide 10 mg-6.25 mg oral tablet: 1 tab(s) orally once a day  cephalexin 250 mg oral capsule: 1 cap(s) orally every 6 hours MDD:4  cephalexin 250 mg oral capsule: 1 cap(s) orally every 6 hours  cephalexin 250 mg oral capsule: 1 cap(s) orally every 6 hours  Eliquis 5 mg oral tablet: 1 tab(s) orally 2 times a day; last dose 07/28/20  Januvia 100 mg oral tablet: 1 tab(s) orally once a day  losartan 100 mg oral tablet: 1 tab(s) orally once a day  metFORMIN 1000 mg oral tablet: 1 tab(s) orally 2 times a day  NovoLIN N 100 units/mL subcutaneous suspension: 20-25 unit(s) subcutaneous 2 times a day  sertraline 50 mg oral tablet: 1 tab(s) orally once a day  Vitamin D2 50,000 intl units (1.25 mg) oral capsule: 1 cap(s) orally once a week amLODIPine 10 mg oral tablet: 1 tab(s) orally once a day  atorvastatin 10 mg oral tablet: 1 tab(s) orally once a day  bisoprolol-hydrochlorothiazide 10 mg-6.25 mg oral tablet: 1 tab(s) orally once a day  cephalexin 250 mg oral capsule: 1 cap(s) orally every 6 hours  Eliquis 5 mg oral tablet: 1 tab(s) orally 2 times a day; last dose 07/28/20  Januvia 100 mg oral tablet: 1 tab(s) orally once a day  losartan 100 mg oral tablet: 1 tab(s) orally once a day  metFORMIN 1000 mg oral tablet: 1 tab(s) orally 2 times a day  NovoLIN N 100 units/mL subcutaneous suspension: 20-25 unit(s) subcutaneous 2 times a day  sertraline 50 mg oral tablet: 1 tab(s) orally once a day  Vitamin D2 50,000 intl units (1.25 mg) oral capsule: 1 cap(s) orally once a week

## 2020-08-01 NOTE — DISCHARGE NOTE PROVIDER - HOSPITAL COURSE
69 y/o  female with PMHx of HTN, HLD, obesity, T2DM, BRCA positive, Afib, presents to PST for pre op evaluation with hx of  abn. mammography 12/2019, US/ biopsy of right breast. Pre op diagnosis: malignant neoplasm of central portion of right female breast, s/p R total mastectomy with R sentinel lymph node biopsy, plastics closure and 2 DARRELL drains (7/31), procedure well tolerated.

## 2020-08-01 NOTE — DISCHARGE NOTE NURSING/CASE MANAGEMENT/SOCIAL WORK - NSDPDISTO_GEN_ALL_CORE
Home CONSTITUTIONAL: No fevers or chills  EYES/ENT: No visual changes. No discharge from eyes. No vertigo. No throat pain. No dysphagia.  NECK: No pain or stiffness or rigidity.  RESPIRATORY: No cough, wheezing, or hemoptysis. No shortness of breath.  CARDIOVASCULAR: +chest pain. No palpitations.   GASTROINTESTINAL: No abdominal pain. No nausea, vomiting, or hematemesis; No diarrhea or constipation. No melena or hematochezia.  GENITOURINARY: No dysuria, hesitancy, frequency or hematuria.  NEUROLOGICAL: No numbness or weakness. No change in speech. No fecal or urinary incontinence.   MSK: No joint swelling or erythema. No back pain.  SKIN: +Left thigh hematoma and bruising.   PSYCH: Normal affect. Normal mood.    Review of systems negative except for items noted above.

## 2020-08-01 NOTE — DISCHARGE NOTE NURSING/CASE MANAGEMENT/SOCIAL WORK - PATIENT PORTAL LINK FT
You can access the FollowMyHealth Patient Portal offered by [facility] by registering at the following website: [email or web address]. By joining [facility]’s FollowMyHealth portal, you will also be able to view your health information using other applications (apps) compatible with our system.

## 2020-08-01 NOTE — PROGRESS NOTE ADULT - SUBJECTIVE AND OBJECTIVE BOX
SURGERY  Pager: [phone]    STATUS POST:  R total mastectomy with R sentinel lymph node bx with plastics closure and 2 DARRELL drains.    POST OPERATIVE DAY #1      INTERVAL EVENTS/SUBJECTIVE: No acute events overnight, patient seen at bedside this morning on rounds. No n/v, pain well controlled.     ______________________________________________  OBJECTIVE:   T(C): 36.7 (08-01-20 @ 06:06), Max: 36.8 (07-31-20 @ 17:43)  HR: 81 (08-01-20 @ 06:06) (79 - 100)  BP: 117/81 (08-01-20 @ 06:06) (112/81 - 163/88)  RR: 18 (08-01-20 @ 06:06) (13 - 18)  SpO2: 97% (08-01-20 @ 06:06) (93% - 100%)  Wt(kg): --  CAPILLARY BLOOD GLUCOSE      POCT Blood Glucose.: 184 mg/dL (01 Aug 2020 07:47)  POCT Blood Glucose.: 201 mg/dL (01 Aug 2020 06:05)  POCT Blood Glucose.: 313 mg/dL (01 Aug 2020 01:14)  POCT Blood Glucose.: 322 mg/dL (01 Aug 2020 00:35)  POCT Blood Glucose.: 331 mg/dL (31 Jul 2020 21:21)  POCT Blood Glucose.: 328 mg/dL (31 Jul 2020 17:00)  POCT Blood Glucose.: 239 mg/dL (31 Jul 2020 12:09)    I&O's Detail    31 Jul 2020 07:01  -  01 Aug 2020 07:00  --------------------------------------------------------  IN:    lactated ringers.: 30 mL    Oral Fluid: 240 mL  Total IN: 270 mL    OUT:    Bulb: 5 mL    Bulb: 45 mL    Voided: 800 mL  Total OUT: 850 mL    Total NET: -580 mL          Physical exam:  Gen: NAD, lying in bed   Abdomen: soft, nondistended, nontender, 2 DARRELL drains with serosanguinous output   Vascular:   ______________________________________________  LABS:  CBC Full  -  ( 01 Aug 2020 05:30 )  WBC Count : 10.76 K/uL  RBC Count : 2.90 M/uL  Hemoglobin : 9.7 g/dL  Hematocrit : 30.9 %  Platelet Count - Automated : 132 K/uL  Mean Cell Volume : 106.6 fL  Mean Cell Hemoglobin : 33.4 pg  Mean Cell Hemoglobin Concentration : 31.4 %  Auto Neutrophil # : x  Auto Lymphocyte # : x  Auto Monocyte # : x  Auto Eosinophil # : x  Auto Basophil # : x  Auto Neutrophil % : x  Auto Lymphocyte % : x  Auto Monocyte % : x  Auto Eosinophil % : x  Auto Basophil % : x    08-01    136  |  98  |  23  ----------------------------<  239<H>  5.2   |  27  |  1.06    Ca    8.4      01 Aug 2020 05:30  Phos  3.4     08-01  Mg     1.2     08-01      ____________________________________________ SURGERY  Pager: [phone]    STATUS POST:  R total mastectomy with R sentinel lymph node bx with plastics closure and 2 DARRELL drains.    POST OPERATIVE DAY #1      INTERVAL EVENTS/SUBJECTIVE: No acute events overnight, patient seen at bedside this morning on rounds. No n/v, pain well controlled.     ______________________________________________  OBJECTIVE:   T(C): 36.7 (08-01-20 @ 06:06), Max: 36.8 (07-31-20 @ 17:43)  HR: 81 (08-01-20 @ 06:06) (79 - 100)  BP: 117/81 (08-01-20 @ 06:06) (112/81 - 163/88)  RR: 18 (08-01-20 @ 06:06) (13 - 18)  SpO2: 97% (08-01-20 @ 06:06) (93% - 100%)  Wt(kg): --  CAPILLARY BLOOD GLUCOSE      POCT Blood Glucose.: 184 mg/dL (01 Aug 2020 07:47)  POCT Blood Glucose.: 201 mg/dL (01 Aug 2020 06:05)  POCT Blood Glucose.: 313 mg/dL (01 Aug 2020 01:14)  POCT Blood Glucose.: 322 mg/dL (01 Aug 2020 00:35)  POCT Blood Glucose.: 331 mg/dL (31 Jul 2020 21:21)  POCT Blood Glucose.: 328 mg/dL (31 Jul 2020 17:00)  POCT Blood Glucose.: 239 mg/dL (31 Jul 2020 12:09)    I&O's Detail    31 Jul 2020 07:01  -  01 Aug 2020 07:00  --------------------------------------------------------  IN:    lactated ringers.: 30 mL    Oral Fluid: 240 mL  Total IN: 270 mL    OUT:    Bulb: 5 mL    Bulb: 45 mL    Voided: 800 mL  Total OUT: 850 mL    Total NET: -580 mL          Physical exam:  Gen: NAD, lying in bed   Abdomen: soft, nondistended, nontender, 2 DARRELL drains with serosanguinous output   Vascular: no LE edema, pulses full   ______________________________________________  LABS:  CBC Full  -  ( 01 Aug 2020 05:30 )  WBC Count : 10.76 K/uL  RBC Count : 2.90 M/uL  Hemoglobin : 9.7 g/dL  Hematocrit : 30.9 %  Platelet Count - Automated : 132 K/uL  Mean Cell Volume : 106.6 fL  Mean Cell Hemoglobin : 33.4 pg  Mean Cell Hemoglobin Concentration : 31.4 %  Auto Neutrophil # : x  Auto Lymphocyte # : x  Auto Monocyte # : x  Auto Eosinophil # : x  Auto Basophil # : x  Auto Neutrophil % : x  Auto Lymphocyte % : x  Auto Monocyte % : x  Auto Eosinophil % : x  Auto Basophil % : x    08-01    136  |  98  |  23  ----------------------------<  239<H>  5.2   |  27  |  1.06    Ca    8.4      01 Aug 2020 05:30  Phos  3.4     08-01  Mg     1.2     08-01      ____________________________________________

## 2020-08-01 NOTE — DISCHARGE NOTE PROVIDER - PROVIDER TOKENS
PROVIDER:[TOKEN:[[id number]]] PROVIDER:[TOKEN:[[id number]]],PROVIDER:[TOKEN:[[id number]]]

## 2020-08-01 NOTE — DISCHARGE NOTE PROVIDER - CARE PROVIDER_API CALL
Yumiko Del Rio  SURGERY  [address]  [place] [address]  Phone: [phone]  Fax: [phone]  Follow Up Time: Yumiko Del Rio  SURGERY  [address]  [place] [address]  Phone: [phone]  Fax: [phone]  Follow Up Time:     Gurmeet Jiménez  PLASTIC SURGERY  [address]  [place] [address]  Phone: [phone]  Fax: [phone]  Follow Up Time:

## 2020-08-01 NOTE — PROGRESS NOTE ADULT - SUBJECTIVE AND OBJECTIVE BOX
no complaints, minimal pain only on getting in or out of bed, ambulating    PE:  incision closed.  no erythema, fluctuance or discharge.  clean and dry.  no collection or hematoma.  flaps viable.    DARRELL serosanguinous R B 45 cc last shift, RA 5 cc last shift.  stripped and milked but both seem to be patent no clot.      Imp: recovering well    Plan: ok for discharge.  drain care and supervision by VNS.  keflex 500 mg po qid, vicodin ES 1 po q4h prn or plain tylenol.  resume eliquis in 24 additional hours.

## 2020-08-02 ENCOUNTER — TRANSCRIPTION ENCOUNTER (OUTPATIENT)
Age: 70
End: 2020-08-02

## 2020-08-06 LAB — SURGICAL PATHOLOGY STUDY: SIGNIFICANT CHANGE UP

## 2021-03-21 NOTE — ASU PATIENT PROFILE, ADULT - SURGERY TIME
Subjective     No acute events overnight, diuresing well.  Denies any chest pain or shortness of breath.  Still endorsing cough.  Tolerating diet without any issues.    Objective     I/O's    Intake/Output Summary (Last 24 hours) at 3/21/2021 1039  Last data filed at 3/21/2021 0400  Gross per 24 hour   Intake 460 ml   Output 1600 ml   Net -1140 ml       Last Recorded Vitals  Vitals with min/max:    Temp:  [98.1 °F (36.7 °C)-99.1 °F (37.3 °C)] 98.1 °F (36.7 °C)  Heart Rate:  [70-81] 71  Resp:  [18] 18  BP: (131-163)/(64-70) 146/66      Vital Last Value 24 Hour Range   Temperature 98.1 °F (36.7 °C) (03/21/21 0818) Temp  Min: 98.1 °F (36.7 °C)  Max: 99.1 °F (37.3 °C)   Pulse 71 (03/21/21 0818) Pulse  Min: 70  Max: 81   Respiratory 18 (03/21/21 0414) Resp  Min: 18  Max: 18   Non-Invasive  Blood Pressure (!) 146/66 (03/21/21 0818) BP  Min: 131/64  Max: 163/67   Pulse Oximetry 91 % (03/21/21 0818) SpO2  Min: 91 %  Max: 96 %   Arterial   Blood Pressure   No data recorded        Body mass index is 25.77 kg/m².    General:  Alert, awake, no acute distress   HEENT: PERRL, EOMI, moist mucus membranes  Cardiovascular:   Regular rate and rhythm, normal peripheral perfusion, no murmur.   Respiratory: Non-labored respirations on room air, no wheezing.  Faint bilateral lower lobe inspiratory crackles  Gastrointestinal: Soft, nontender, non distended.   Musculoskeletal: Minimal LE edema.  Skin: No lesions or erythema  Neurological:  AOX3, no focal deficits  Psych: Normal mood, affect     Labs   Recent Labs   Lab 03/21/21  0511   WBC 6.0   RBC 3.18*   HGB 8.1*   HCT 27.1*        Recent Labs   Lab 03/21/21  0511 03/20/21  0509 03/19/21  1010   SODIUM 133* 133* 133*   POTASSIUM 3.7 4.4 4.2   CHLORIDE 103 104 104   CO2 25 23 20*   BUN 20 20 19   CREATININE 1.69* 1.54* 1.39*   CALCIUM 8.1* 8.4 8.8   ALBUMIN 2.8* 2.8* 3.1*   BILIRUBIN 0.8 0.7 1.1*   ALKPT 128* 140* 169*   GPT 14 13 14   AST 16 15 14   GLUCOSE 129* 112* 114*      Imaging  CTA CHEST PULMONARY EMBOLISM W CONTRAST  Narrative: EXAMINATION: CTA CHEST PULMONARY EMBOLISM W CONTRAST    CLINICAL INDICATION: elevated d-dimer    COMPARISON: Chest x-ray from the same date.    TECHNIQUE: Multiple contiguous axial sections through the chest were  acquired after the intravenous administration of 100 cc of Omnipaque 350.   Coronal and sagittal reformatted images were provided for interpretation.  3-D post processing is performed including MIPS imaging by technologist  under direct supervision of radiologist.    RADIATION MODULATION: Adjustment of the mA and/or kV was done according to  the patient's size.    FINDINGS:   The pulmonary arteries are well-opacified to the segmental level.   Evaluation for pulmonary emboli is somewhat limited by venous contamination  and also by presence of relatively large pleural effusions.  No filling  defect or vascular cutoff or filling defect to suggest an acute pulmonary  embolism is seen.    The heart size is normal.  No pericardial effusion.  No intraventricular  septal bowing.  The thoracic aorta and main pulmonary artery are normal in  caliber.    Increased number of nonenlarged mediastinal and hilar lymph nodes are  nonspecific.  No enlarged axillary lymph nodes are seen.    The central airways are patent.  Large right and moderate left pleural  effusions with associated atelectasis.  Nondependent consolidations in both  upper lobes could represent atelectasis and/or infection.  Mosaic  attenuation of the lungs along with interlobular septal thickening are  presumably related to pulmonary edema.  Nodular density is seen in the  right upper lobe anteriorly on series 5, image 71 and is subpleural in  location.  No pneumothorax.    The thyroid gland is prominent.  Chest wall edema is noted.    Evaluation of the imaged upper abdomen demonstrates small volume ascites  and generalized mesenteric edema.    No destructive bone lesion.  Impression: 1.    No acute pulmonary embolism.  2.   Anasarca.  Large right and moderate left pleural effusions with  associated atelectasis.  Small volume ascites.  3.   Consolidations in the upper lobes could represent atelectasis and/or  infection.  A subpleural nodular opacity is also seen in the right upper  lobe, possibly related to developing COVID pneumonia versus a focal nodule.   Continued CT follow-up should be considered.    Electronically Signed by: WOJCIECH HART MD   Signed on: 3/19/2021 12:25 PM   XR CHEST PA OR AP 1 VIEW  Narrative: EXAM: XR CHEST PA OR AP 1 VIEW    CLINICAL INDICATION: Shortness of breath    COMPARISON: None.  Impression: FINDINGS/IMPRESSION: Moderate cardiomegaly.  Moderate vascular congestion.   Right suprahilar geographic alveolar lobar consolidation and patchy  bibasilar and lingular airspace opacities, left greater than right,  consistent with multilobar pneumonia.  No definite pneumothorax.  Small  right greater than left pleural effusion.    Electronically Signed by: MEHRDAD STEWARD M.D.   Signed on: 3/19/2021 10:18 AM     Assessment and Plan  #Hypertensive urgency  #Acute heart failure exacerbation  #Bilateral lower extremity edema  #Elevated troponin  #COVID-19+  #Acute kidney injury  #Type II Diabetes Mellitus  #Hyponatremia  #Moderate stenosis of L ICA  #Normocytic anemia     Acute HF exacerbation in setting of uncontrolled HTN  Lower extremity edema, improving from admission  Elevated troponins, suspect in setting of above.  Pt currently asymptomatic.  Admission EKG NSR, non-ischemic.    Cardiology following, diuresing with IV lasix 40mg bid.  Monitor intake/output.  Patient appears euvolemic today, will lower evening dose of lasix in setting of uptrending serum Cr, discussed with Cardiology  TTE pending   BP remains uncontrolled, increased to amlodipine 10mg daily  H/o COVID pna in Feb 2021.  Acute presentation more consistent with acute HF exacerbation - low suspicion for infectious  etiology at this time.  ID following.  On metformin, januvia, lantus 5U daily at home.  Holding home meds, will monitor on low dose ISS.  Resume statin   Low sodium, diabetic diet     DVT Prophylaxis  heparin    Code Status  Code Status: Selective Treatment/DNR    Primary Care Physician  Edin Harvey MD  [facility]        Edin Mcclure MD     07:30

## 2021-04-12 NOTE — H&P PST ADULT - NSANTHNECKRD_ENT_A_CORE
Ginna Campos is calling to request a refill on the following medication(s):    Medication Request:  Requested Prescriptions     Pending Prescriptions Disp Refills    cetirizine (ZYRTEC) 10 MG tablet [Pharmacy Med Name: CETIRIZINE HCL 10 MG TABLET 10 Tablet] 30 tablet 3     Sig: TAKE 1 TABLET BY MOUTH DAILY       Last Visit Date (If Applicable):  8/30/8938    Next Visit Date:    4/12/2021
No

## 2021-11-11 NOTE — ASU PATIENT PROFILE, ADULT - NSCAFFEINEWITH_GEN_ALL_CORE_SD
none Calcipotriene Counseling:  I discussed with the patient the risks of calcipotriene including but not limited to erythema, scaling, itching, and irritation.

## 2022-10-25 NOTE — ED ADULT TRIAGE NOTE - IDEAL BODY WEIGHT(KG)
For information on Fall & Injury Prevention, visit: [email or web address] OR  [email or web address] OR  https://www.cdc.gov/steadi/patient.html
55

## 2022-12-02 NOTE — ED CDU PROVIDER INITIAL DAY NOTE - CROS ED RESP ALL NEG
- - - Complex Repair Preamble Text (Leave Blank If You Do Not Want): Extensive wide undermining was performed.

## 2023-02-08 NOTE — PATIENT PROFILE ADULT - NSASFALLNEEDSASSISTWITH_GEN_A_NUR

## 2023-02-08 NOTE — ED CDU PROVIDER DISPOSITION NOTE - SECONDARY DIAGNOSIS.
Speech Language Pathology   [facility] FEES / Discharge Summary  JOSE L Gonzalez       Patient Name: Clauyd Pabon  : 1966  MRN: [id number]    Today's Date: 2023      Visit Date: 2023     Visit Dx:     ICD-10-CM ICD-9-CM   1. Cerebrovascular accident (CVA), unspecified mechanism ([facility])  I63.9 434.91   2. Garbled speech  R47.89 784.59       Patient Active Problem List   Diagnosis   • Paroxysmal atrial fibrillation (HCC)   • Palpitations   • Essential (primary) hypertension   • Mitral regurgitation   • Paroxysmal atrial flutter (HCC)   • Persons encountering health services in other specified circumstances   • Arthralgia of hip   • At risk for osteoporosis   • Cellulitis of orbit   • Cerebrovascular accident (CVA) (HCC)   • Congenital spondylolisthesis   • Degeneration of intervertebral disc   • Dysphagia   • Encounter for other specified aftercare   • Screening for osteoporosis   • Fall   • Family history of diabetes mellitus   • Low back pain   • Thoracic or lumbosacral neuritis or radiculitis   • Obesity   • Open fracture of angle of jaw (HCC)   • Failed total hip arthroplasty (HCC)   • Osteoarthritis of hip   • Other specified complications of surgical and medical care, not elsewhere classified, initial encounter   • Other specified disorders of bone density and structure, multiple sites   • Overweight   • Pain in extremity   • Aftercare following joint replacement   • Wound infection   • Chronic anticoagulation   • Prediabetes        Past Medical History:   Diagnosis Date   • AF (paroxysmal atrial fibrillation) (HCC)    • Atrial flutter (HCC)    • Injury of back     spinal fusion   • Stroke ([facility])             Past Surgical History:   Procedure Laterality Date   • CARDIAC SURGERY      Ablation   • JOINT REPLACEMENT      Hip replacement   • REVISION OF TOTAL HIP FEMORAL Left    • SPINE SURGERY      Fusion   • VASECTOMY       Patient was not wearing a face mask during this therapy encounter. The  "patient's mask was removed to allow po trials to be administered for purposes of this assessment. The patient's mask was replaced prior to being transported back up to their room. Therapist used appropriate personal protective equipment including mask, eye protection and gloves.  Mask used was standard procedure mask. Appropriate PPE was worn during the entire therapy session. Hand hygiene was completed before and after therapy session. Patient is not in enhanced droplet precautions.        SLP Adult Swallow Evaluation     Row Name 02/08/23 1000       Rehab Evaluation    Document Type evaluation  -MC       General Information    Patient Profile Reviewed yes  -MC    Pertinent History Of Current Problem Pt is a 56 year old male who was referred for an outpatient video fluoroscopic swallow study by PCP Eugenia PUCKETT.   Pt seen by [facility] department for an OP VFSS on 11/23/2016 and 8/23/2018 w/ recommendation of regular and thin liquids.   He was also seen on 11/17/22 for an outpatient speech/language evaluation due to \"garbled speech.\" Formal language testing was WFL, however dysarthria and dysphonia was noted.     Most recent available neurology note from OSH on 2/4/22 reads \"In summary, Mr. 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 Even though he reports developing symptoms a few years after his stroke, the localization (Mollaret triangle) supports the post-stroke etiology, rather than an unrelated neurodegenerative process. \"    There may be some confusion/discrepancy regarding reason for referral. Order placed was for a video swallow study however following completion of study, pt's s/o reports she is unclear why he is having repeat VFSS done, and was under the impression he was having a scope. Pt also believed he was having a \"scope in the nose.\" It was later determined that a videostroboscopy was possibly the desired test to assess vocal cord " "function. Per EMR review, it does appear that NP was aware of this, but incorrect order was placed.     Pt was seen by Dr. Wade with otolaryngology on 7/21/21 with the following findings:    \"Regular clonus/vertical tremor of the glottis with bowing of the L vocal fold. Possible thinning of the L true vocal cord. False vocal fold with hyperfunction and partial obscuring of the left, right seems to be more \"thick\". There was no mobility concersn with regards to adduction and abduction. Persistent glottic gap and disorganized vibratory function and not able to get a good assesmssent of vib function.\"     -    Current Method of Nutrition regular textures;thin liquids  -       Oral Musculature and Cranial Nerve Assessment    Oral Motor, Comment R labial and lingual asymmetry noted upon all ROM tasks    Motor speech is notably dysarthric. Suspect this is consistent with late affects from CVA as described above.    Informally, suspect dysphonia is secondary to established dysarthria and palatal tremor. While voice interventions may improve dysphonia in relation to dysarthria with breath support work, traditional voice therapy cannot improve/eliminate palatal tremor.    -       General Eating/Swallowing Observations    Respiratory Support Currently in Use room air  -       MBS/VFSS    Utensils Used spoon;straw  -    Consistencies Trialed regular textures;soft to chew textures;mechanical ground textures;mixed consistency;thin liquids  -       MBS/VFSS Interpretation    Oral Prep Phase WFL  -    Oral Transit Phase WFL  -    Oral Residue impaired  -    VFSS Summary MBSS performed in the lateral projection with the following consistencies respectively, as self-fed: thin by straw x2, puree x2, soft solid/mixed consistency x2, regular solid x1, thin liquid by straw x1. All trials with adequate barium concentration for fluoroscopy view.     Oral phase was WFL with adequate mastication, bolus " formation/cohesion, and a/p transit.  No anterior loss or oral residue observed.  Premature spillage is noted across all trials to varying degrees, with swallow initiation WFL at the vallecula or pyriforms. This does result in appearance of incoordinated swallow.      Adequate epiglottic deflection and anterior hyolaryngeal movement. Laryngeal vestibule seal WFL.  No aspiration or penetration observed at any time during exam.  No significant pharyngeal residue observed.    Overall swallow function appears to be appropriate to continue on regular diet with thin liquids. Ensure aspiration precautions/positioning. All results/recs were relayed to the patient and his wife, including informal recommendations regarding voice therapy/intervention.      -       Initiation of Pharyngeal Swallow    Initiation of Pharyngeal Swallow bolus in pyriform sinuses  -    Pharyngeal Phase impaired pharyngeal phase of swallowing  -    Pharyngeal Phase, Comment Swallow delay, swallow incoordination   -       SLP Evaluation Clinical Impression    SLP Swallowing Diagnosis functional oral phase;functional pharyngeal phase  -    Functional Impact risk of malnutrition  given nature of complaints  -       Recommendations    Therapy Frequency (Swallow) evaluation only  -    SLP Diet Recommendation regular textures;thin liquids  -    Recommended Diagnostics --    May benefit from videostroboscopy and voice therapy at a later date if dysphonia cannot be adequately treated with medical intervention, given dx of palatal tremor. Palatal tremor cannot be remediated with traditional voice therapy. Prior otolaryngology note reports pt is also not a candidate for botox.     -    Demonstrates Need for Referral to Another Service neurology;otolaryngology (ENT)  -          User Key  (r) = Recorded By, (t) = Taken By, (c) = Cosigned By    Initials Name Provider Type     Guerline Kirby, SLP Speech and Language Pathologist                   Guerline Kirby, SLP  2/8/2023   Thrombocytopenia

## 2023-11-15 NOTE — PATIENT PROFILE ADULT - BRADEN [employer]
Patient ID: Jensen Red is a 78 y.o. female Date of Birth 1953     Chief Complaint  Chief Complaint   Patient presents with    general surgeon consult for inclusion cyst removal     General surgeon consult for inclusion cyst removal.        Allergies  Patient has no known allergies. Assessment:     Diagnoses and all orders for this visit:    Inclusion cyst  -     Ambulatory Referral to General Surgery; Future    Back wound, left, initial encounter  -     lidocaine (XYLOCAINE) 4 % topical solution 5 mL  -     Wound miscellaneous orders; Future                Plan: It was a pleasure to see Jensen Red for wound care follow up today  Wound area cleansed with saline and gauze. Patient has what appears to be a large inclusion cyst on her upper left back. This is open in more than one area as patient says she has been using a hard scrub brush to clean her back there. Lateral to this there also appears to be another cyst is not yet draining. We will refer patient to general surgery for removal.  May follow-up with wound management as needed in the future. At this time, patient is discharged wound management. All plans of care discussed with patient at bedside who verbalized understanding with treatment plan.     Wound 11/15/23 Back Left (Active)   Wound Image Images linked 11/15/23 0821   Wound Description Pink;Light purple;Epithelialization 11/15/23 0821   Kely-wound Assessment Induration;Erythema 11/15/23 0821   Wound Length (cm) 2 cm 11/15/23 0821   Wound Width (cm) 2.4 cm 11/15/23 0821   Wound Depth (cm) 0.1 cm 11/15/23 0821   Wound Surface Area (cm^2) 4.8 cm^2 11/15/23 0821   Wound Volume (cm^3) 0.48 cm^3 11/15/23 0821   Calculated Wound Volume (cm^3) 0.48 cm^3 11/15/23 0821   Drainage Amount Moderate 11/15/23 0821   Drainage Description Serosanguineous 11/15/23 0821   Non-staged Wound Description Full thickness 11/15/23 0821   Dressing Status Intact 11/15/23 0821       Wound 11/15/23 Back Left (Active)   Date First Assessed/Time First Assessed: 11/15/23 0819   Location: Back  Wound Location Orientation: Left       [REMOVED] Wound 05/03/21 Other (comment) Sacrum (Removed)   Resolved Date: 11/15/23  Date First Assessed/Time First Assessed: 05/03/21 2922   Primary Wound Type: Other (comment)  Location: Sacrum  Wound Outcome: (c) Other (Comment)       [REMOVED] Wound 08/19/21 Pressure Injury Sacrum Mid (Removed)   Resolved Date: 11/15/23  Date First Assessed/Time First Assessed: 08/19/21 2015   Primary Wound Type: Pressure Injury  Location: Sacrum  Wound Location Orientation: Mid  Wound Description (Comments): open, red, some slough  Dressing Status: Open to ai. .. Subjective:      .    11/15/23: Aby Pacheco is a pleasant 82-year-old female with a past medical history of atrial fibrillation on Eliquis and type II by diabetes mellitus here today for initial wound care consult. Patient notes that she noticed itchiness and wetness of her back for several weeks now. Has been a patient with wound management for lower extremity wounds in the past, so she called her PCP and asked for referral to our office. This is the first time the skin/wound area has been seen by a physician. She notes that the area is very itchy and that she has been using a hard scrub brush to scrub her back aggressively to address that problem. Denies any symptoms of fever chills diaphoresis today. She notes that she has had small areas like this on her back in the past.        The following portions of the patient's history were reviewed and updated as appropriate: allergies, current medications, past family history, past medical history, past social history, past surgical history, and problem list.    Review of Systems   Constitutional:  Negative for chills, diaphoresis, fatigue and fever. Skin:  Positive for wound. All other systems reviewed and are negative.         Objective:       Wound 11/15/23 Back Left (Active) Wound Image Images linked 11/15/23 0821   Wound Description Pink;Light purple;Epithelialization 11/15/23 0821   Kely-wound Assessment Induration;Erythema 11/15/23 0821   Wound Length (cm) 2 cm 11/15/23 0821   Wound Width (cm) 2.4 cm 11/15/23 0821   Wound Depth (cm) 0.1 cm 11/15/23 0821   Wound Surface Area (cm^2) 4.8 cm^2 11/15/23 0821   Wound Volume (cm^3) 0.48 cm^3 11/15/23 0821   Calculated Wound Volume (cm^3) 0.48 cm^3 11/15/23 0821   Drainage Amount Moderate 11/15/23 0821   Drainage Description Serosanguineous 11/15/23 0821   Non-staged Wound Description Full thickness 11/15/23 0821   Dressing Status Intact 11/15/23 0821       BP (!) 141/110   Pulse (!) 114   Temp (!) 96.9 °F (36.1 °C)   Resp 20   Ht 5' 5" (1.651 m)   Wt 127 kg (280 lb)   BMI 46.59 kg/m²     Physical Exam  Vitals reviewed. Constitutional:       General: She is not in acute distress. Appearance: Normal appearance. She is obese. She is not ill-appearing or toxic-appearing. HENT:      Head: Normocephalic and atraumatic. Mouth/Throat:      Mouth: Mucous membranes are moist.   Eyes:      Extraocular Movements: Extraocular movements intact. Pulmonary:      Effort: Pulmonary effort is normal.   Skin:     Comments: Upper left back there is a large area of circumscribed erythema with fluctuance underneath. Draining from 3 small punctate areas there is yellow sebum like material.  Mildly tender to touch. Erythema does not go beyond the area of fluctuance. Approximately 10 cm lateral to this there is also an area of fluctuance black lesion protruding from it. No drainage noted at this site. Neurological:      Mental Status: She is alert. Psychiatric:         Mood and Affect: Mood normal.         Behavior: Behavior normal.           Wound Instructions:  Orders Placed This Encounter   Procedures    Wound miscellaneous orders     Referred to general surgeon.      Standing Status:   Future     Standing Expiration Date: 11/15/2024    Ambulatory Referral to General Surgery     Standing Status:   Future     Standing Expiration Date:   11/15/2024     Referral Priority:   Routine     Referral Type:   Consult - AMB     Referral Reason:   Specialty Services Required     Referred to Provider:   Genesis Little MD     Requested Specialty:   General Surgery     Number of Visits Requested:   1     Expiration Date:   11/15/2024        Diagnosis ICD-10-CM Associated Orders   1. Inclusion cyst  L72.0 Ambulatory Referral to General Surgery      2. Back wound, left, initial encounter  S21.202A lidocaine (XYLOCAINE) 4 % topical solution 5 mL     Wound miscellaneous orders          --  Siria Mata MD    "This note has been constructed using a voice recognition system. Therefore there may be syntax, spelling, and/or grammatical errors. Occasional wrong word or "sound alike" substitutions may have occurred due to the inherent limitations of voice recognition software. Read the chart carefully and recognize, using context, where substitutions have occurred.  Please call if you have any questions." (3) slightly limited

## 2024-07-26 NOTE — ASU PATIENT PROFILE, ADULT - NS PRO INFO GIVEN TO3

## 2025-02-18 ENCOUNTER — EMERGENCY (EMERGENCY)
Facility: HOSPITAL | Age: 75
LOS: 1 days | Discharge: HOME | End: 2025-02-18
Attending: STUDENT IN AN ORGANIZED HEALTH CARE EDUCATION/TRAINING PROGRAM
Payer: MEDICARE

## 2025-02-18 VITALS
OXYGEN SATURATION: 95 % | RESPIRATION RATE: 18 BRPM | WEIGHT: 250 LBS | HEART RATE: 86 BPM | DIASTOLIC BLOOD PRESSURE: 79 MMHG | TEMPERATURE: 98 F | SYSTOLIC BLOOD PRESSURE: 122 MMHG

## 2025-02-18 DIAGNOSIS — Z92.21 PERSONAL HISTORY OF ANTINEOPLASTIC CHEMOTHERAPY: Chronic | ICD-10-CM

## 2025-02-18 DIAGNOSIS — Z90.710 ACQUIRED ABSENCE OF BOTH CERVIX AND UTERUS: Chronic | ICD-10-CM

## 2025-02-18 DIAGNOSIS — Z41.1 ENCOUNTER FOR COSMETIC SURGERY: Chronic | ICD-10-CM

## 2025-02-18 DIAGNOSIS — Z98.49 CATARACT EXTRACTION STATUS, UNSPECIFIED EYE: Chronic | ICD-10-CM

## 2025-02-18 DIAGNOSIS — Z98.89 OTHER SPECIFIED POSTPROCEDURAL STATES: Chronic | ICD-10-CM

## 2025-02-18 DIAGNOSIS — Z90.49 ACQUIRED ABSENCE OF OTHER SPECIFIED PARTS OF DIGESTIVE TRACT: Chronic | ICD-10-CM

## 2025-02-18 DIAGNOSIS — Z90.12 ACQUIRED ABSENCE OF LEFT BREAST AND NIPPLE: Chronic | ICD-10-CM

## 2025-02-18 PROCEDURE — 99285 EMERGENCY DEPT VISIT HI MDM: CPT

## 2025-02-18 NOTE — ED ADULT TRIAGE NOTE - CHIEF COMPLAINT QUOTE
pt biba after pt accidently took 3 bars of xanax thinking they were Tylenol, pt then fell to knees and fell onto R hip, denies hitting head or LOC.

## 2025-02-19 VITALS
TEMPERATURE: 98 F | HEART RATE: 89 BPM | OXYGEN SATURATION: 98 % | RESPIRATION RATE: 16 BRPM | DIASTOLIC BLOOD PRESSURE: 77 MMHG | SYSTOLIC BLOOD PRESSURE: 133 MMHG

## 2025-02-19 LAB
ALBUMIN SERPL ELPH-MCNC: 3.7 G/DL — SIGNIFICANT CHANGE UP (ref 3.3–5.2)
ALP SERPL-CCNC: 112 U/L — SIGNIFICANT CHANGE UP (ref 40–120)
ALT FLD-CCNC: 25 U/L — SIGNIFICANT CHANGE UP
ANION GAP SERPL CALC-SCNC: 15 MMOL/L — SIGNIFICANT CHANGE UP (ref 5–17)
AST SERPL-CCNC: 45 U/L — HIGH
BASOPHILS # BLD AUTO: 0.06 K/UL — SIGNIFICANT CHANGE UP (ref 0–0.2)
BASOPHILS NFR BLD AUTO: 0.4 % — SIGNIFICANT CHANGE UP (ref 0–2)
BILIRUB SERPL-MCNC: 0.5 MG/DL — SIGNIFICANT CHANGE UP (ref 0.4–2)
BUN SERPL-MCNC: 19.6 MG/DL — SIGNIFICANT CHANGE UP (ref 8–20)
CALCIUM SERPL-MCNC: 9.3 MG/DL — SIGNIFICANT CHANGE UP (ref 8.4–10.5)
CHLORIDE SERPL-SCNC: 97 MMOL/L — SIGNIFICANT CHANGE UP (ref 96–108)
CO2 SERPL-SCNC: 24 MMOL/L — SIGNIFICANT CHANGE UP (ref 22–29)
CREAT SERPL-MCNC: 0.79 MG/DL — SIGNIFICANT CHANGE UP (ref 0.5–1.3)
EGFR: 79 ML/MIN/1.73M2 — SIGNIFICANT CHANGE UP
EOSINOPHIL # BLD AUTO: 0.01 K/UL — SIGNIFICANT CHANGE UP (ref 0–0.5)
EOSINOPHIL NFR BLD AUTO: 0.1 % — SIGNIFICANT CHANGE UP (ref 0–6)
GAS PNL BLDV: SIGNIFICANT CHANGE UP
GLUCOSE SERPL-MCNC: 244 MG/DL — HIGH (ref 70–99)
HCT VFR BLD CALC: 43.9 % — SIGNIFICANT CHANGE UP (ref 34.5–45)
HGB BLD-MCNC: 14.4 G/DL — SIGNIFICANT CHANGE UP (ref 11.5–15.5)
HIV 1 & 2 AB SERPL IA.RAPID: SIGNIFICANT CHANGE UP
IMM GRANULOCYTES # BLD AUTO: 0.07 K/UL — SIGNIFICANT CHANGE UP (ref 0–0.07)
IMM GRANULOCYTES NFR BLD AUTO: 0.4 % — SIGNIFICANT CHANGE UP (ref 0–0.9)
LYMPHOCYTES # BLD AUTO: 0.78 K/UL — LOW (ref 1–3.3)
LYMPHOCYTES NFR BLD AUTO: 4.9 % — LOW (ref 13–44)
MCHC RBC-ENTMCNC: 31 PG — SIGNIFICANT CHANGE UP (ref 27–34)
MCHC RBC-ENTMCNC: 32.8 G/DL — SIGNIFICANT CHANGE UP (ref 32–36)
MCV RBC AUTO: 94.6 FL — SIGNIFICANT CHANGE UP (ref 80–100)
MONOCYTES # BLD AUTO: 0.58 K/UL — SIGNIFICANT CHANGE UP (ref 0–0.9)
MONOCYTES NFR BLD AUTO: 3.6 % — SIGNIFICANT CHANGE UP (ref 2–14)
NEUTROPHILS # BLD AUTO: 14.58 K/UL — HIGH (ref 1.8–7.4)
NEUTROPHILS NFR BLD AUTO: 90.6 % — HIGH (ref 43–77)
NRBC # BLD AUTO: 0 K/UL — SIGNIFICANT CHANGE UP (ref 0–0)
NRBC # FLD: 0 K/UL — SIGNIFICANT CHANGE UP (ref 0–0)
NRBC BLD AUTO-RTO: 0 /100 WBCS — SIGNIFICANT CHANGE UP (ref 0–0)
PLATELET # BLD AUTO: 186 K/UL — SIGNIFICANT CHANGE UP (ref 150–400)
PMV BLD: 8.9 FL — SIGNIFICANT CHANGE UP (ref 7–13)
POTASSIUM SERPL-MCNC: 5.9 MMOL/L — HIGH (ref 3.5–5.3)
POTASSIUM SERPL-SCNC: 5.9 MMOL/L — HIGH (ref 3.5–5.3)
PROT SERPL-MCNC: 7.9 G/DL — SIGNIFICANT CHANGE UP (ref 6.6–8.7)
RBC # BLD: 4.64 M/UL — SIGNIFICANT CHANGE UP (ref 3.8–5.2)
RBC # FLD: 14 % — SIGNIFICANT CHANGE UP (ref 10.3–14.5)
SODIUM SERPL-SCNC: 136 MMOL/L — SIGNIFICANT CHANGE UP (ref 135–145)
TROPONIN T, HIGH SENSITIVITY RESULT: 7 NG/L — SIGNIFICANT CHANGE UP (ref 0–51)
WBC # BLD: 16.08 K/UL — HIGH (ref 3.8–10.5)
WBC # FLD AUTO: 16.08 K/UL — HIGH (ref 3.8–10.5)

## 2025-02-19 PROCEDURE — 82330 ASSAY OF CALCIUM: CPT

## 2025-02-19 PROCEDURE — 73502 X-RAY EXAM HIP UNI 2-3 VIEWS: CPT

## 2025-02-19 PROCEDURE — 36415 COLL VENOUS BLD VENIPUNCTURE: CPT

## 2025-02-19 PROCEDURE — 84295 ASSAY OF SERUM SODIUM: CPT

## 2025-02-19 PROCEDURE — 85025 COMPLETE CBC W/AUTO DIFF WBC: CPT

## 2025-02-19 PROCEDURE — 82947 ASSAY GLUCOSE BLOOD QUANT: CPT

## 2025-02-19 PROCEDURE — 93010 ELECTROCARDIOGRAM REPORT: CPT

## 2025-02-19 PROCEDURE — 83605 ASSAY OF LACTIC ACID: CPT

## 2025-02-19 PROCEDURE — 72192 CT PELVIS W/O DYE: CPT | Mod: 26

## 2025-02-19 PROCEDURE — 99285 EMERGENCY DEPT VISIT HI MDM: CPT | Mod: 25

## 2025-02-19 PROCEDURE — 72131 CT LUMBAR SPINE W/O DYE: CPT | Mod: 26

## 2025-02-19 PROCEDURE — 70450 CT HEAD/BRAIN W/O DYE: CPT | Mod: 26

## 2025-02-19 PROCEDURE — 85014 HEMATOCRIT: CPT

## 2025-02-19 PROCEDURE — 85018 HEMOGLOBIN: CPT

## 2025-02-19 PROCEDURE — 84132 ASSAY OF SERUM POTASSIUM: CPT

## 2025-02-19 PROCEDURE — 73502 X-RAY EXAM HIP UNI 2-3 VIEWS: CPT | Mod: 26,RT

## 2025-02-19 PROCEDURE — 93005 ELECTROCARDIOGRAM TRACING: CPT

## 2025-02-19 PROCEDURE — 96374 THER/PROPH/DIAG INJ IV PUSH: CPT

## 2025-02-19 PROCEDURE — 82435 ASSAY OF BLOOD CHLORIDE: CPT

## 2025-02-19 PROCEDURE — 80053 COMPREHEN METABOLIC PANEL: CPT

## 2025-02-19 PROCEDURE — 72131 CT LUMBAR SPINE W/O DYE: CPT | Mod: MC

## 2025-02-19 PROCEDURE — 70450 CT HEAD/BRAIN W/O DYE: CPT | Mod: MC

## 2025-02-19 PROCEDURE — 84484 ASSAY OF TROPONIN QUANT: CPT

## 2025-02-19 PROCEDURE — 72192 CT PELVIS W/O DYE: CPT | Mod: MC

## 2025-02-19 PROCEDURE — 82803 BLOOD GASES ANY COMBINATION: CPT

## 2025-02-19 PROCEDURE — 86703 HIV-1/HIV-2 1 RESULT ANTBDY: CPT

## 2025-02-19 RX ORDER — KETOROLAC TROMETHAMINE 10 MG
15 TABLET ORAL ONCE
Refills: 0 | Status: DISCONTINUED | OUTPATIENT
Start: 2025-02-19 | End: 2025-02-19

## 2025-02-19 RX ADMIN — Medication 15 MILLIGRAM(S): at 02:17

## 2025-02-19 NOTE — ED PROVIDER NOTE - NSICDXPASTSURGICALHX_GEN_ALL_CORE_FT
PAST SURGICAL HISTORY:  H/O rhinoplasty     History of chemotherapy S/P Medi port insertion ; 02/2020    S/P cataract extraction, unspecified laterality bilateral    S/P cholecystectomy 1980, open    S/P hysterectomy with oophorectomy 2011 for dermoid cyst x 2    S/P left mastectomy 1994    S/P lumpectomy, right breast 2001

## 2025-02-19 NOTE — ED ADULT NURSE NOTE - OBJECTIVE STATEMENT
PT COMES IN S/P FALL AFTER TAKING 6MG OF XANAX THINKING THEY WERE TYLENOL, PT IS ALERT BUT DROWSY AT THIS TIME WITH C/O BACK PAIN ASSOCIATED WITH THE FALL, NO OTHER COMPLAINTS AT THIS TIME

## 2025-02-19 NOTE — ED PROVIDER NOTE - MUSCULOSKELETAL MINIMAL EXAM
atraumatic/normal range of motion/no muscle tenderness/neck supple/motor intact regular - sodium and cholesterol resticted

## 2025-02-19 NOTE — ED PROVIDER NOTE - PATIENT PORTAL LINK FT
You can access the FollowMyHealth Patient Portal offered by [facility] by registering at the following website: [email or web address]. By joining [employer]’s FollowMyHealth portal, you will also be able to view your health information using other applications (apps) compatible with our system.

## 2025-02-19 NOTE — ED ADULT NURSE REASSESSMENT NOTE - NS ED NURSE REASSESS COMMENT FT1
Assumed pt care at 0730.  pt is awake and alert at this time, aware that she is discharged at this time.  pt states the police broke down her door to bring her to hospital, then put the door back up and that her only set of keys to her home is inside, that she has no way of getting inside at this time.

## 2025-02-19 NOTE — ED PROVIDER NOTE - NSICDXPASTMEDICALHX_GEN_ALL_CORE_FT
PAST MEDICAL HISTORY:  Afib     Anxiety and depression     Breast cancer in female s/p L mastectomy, right lumpectomy, radiation 2011    Calculus of right kidney     CRVO (central retinal vein occlusion), right     Dermoid cyst of ovary, unspecified laterality bilateral    Diabetes     Diabetic retinopathy     Hematuria benigh cytology 2/24/16    History of breast cancer 1994 left mast, chemo  2001 right lumpectomy, RT    History of cholelithiasis 1980    Hyperlipidemia, unspecified hyperlipidemia type     Hypertension     Hypertension     Obesity (BMI 35.0-39.9 without comorbidity)     Osteoporosis     Type 2 diabetes mellitus

## 2025-02-19 NOTE — ED PROVIDER NOTE - NSDCPRINTRESULTS_ED_ALL_ED
Patient requests all Lab, Cardiology, and Radiology Results on their Discharge Instructions FAMILY HISTORY:  Sibling  Still living? Unknown  Family history of congenital malformation, Age at diagnosis: Age Unknown

## 2025-02-19 NOTE — ED PROVIDER NOTE - NSFOLLOWUPINSTRUCTIONS_ED_ALL_ED_FT
1) Follow up with your doctor in 1-2 days  2) Return to the ER for worsening or concerning symptoms      Contusion    A contusion is a deep bruise. Contusions are the result of a blunt injury to tissues and muscle fibers under the skin. The injury causes bleeding under the skin. The skin overlying the contusion may turn blue, purple, or yellow. Minor injuries will give you a painless contusion, but more severe injuries cause contusions that may stay painful and swollen for a few weeks.    Follow these instructions at home:  Pay attention to any changes in your symptoms. Let your health care provider know about them. Take these actions to relieve your pain.        Managing pain, stiffness, and swelling     Use resting, icing, applying pressure (compression), and raising (elevating) the injured area. This is often called the RICE strategy.    Rest the injured area. Return to your normal activities as told by your health care provider. Ask your health care provider what activities are safe for you.  If directed, put ice on the injured area:    Put ice in a plastic bag.  Place a towel between your skin and the bag.  Leave the ice on for 20 minutes, 2–3 times per day.  If directed, apply light compression to the injured area using an elastic bandage. Make sure the bandage is not wrapped too tightly. Remove and reapply the bandage as directed by your health care provider.  If possible, raise (elevate) the injured area above the level of your heart while you are sitting or lying down.        General instructions    Take over-the-counter and prescription medicines only as told by your health care provider.  Keep all follow-up visits as told by your health care provider. This is important.    Contact a health care provider if:  Your symptoms do not improve after several days of treatment.  Your symptoms get worse.  You have difficulty moving the injured area.    Get help right away if:  You have severe pain.  You have numbness in a hand or foot.  Your hand or foot turns pale or cold.    Summary  A contusion is a deep bruise.  Contusions are the result of a blunt injury to tissues and muscle fibers under the skin.  It is treated with rest, ice, compression, and elevation. You may be given over-the-counter medicines for pain.  Contact a health care provider if your symptoms do not improve, or get worse.   Get help right away if you have severe pain, have numbness, or the area turns pale or cold.    ADDITIONAL NOTES AND INSTRUCTIONS    Please follow up with your Primary MD in 24-48 hr.  Seek immediate medical care for any new/worsening signs or symptoms.

## 2025-02-21 DIAGNOSIS — S70.01XA CONTUSION OF RIGHT HIP, INITIAL ENCOUNTER: ICD-10-CM

## 2025-02-21 DIAGNOSIS — C50.912 MALIGNANT NEOPLASM OF UNSPECIFIED SITE OF LEFT FEMALE BREAST: ICD-10-CM
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
## 2025-02-21 DIAGNOSIS — E11.9 TYPE 2 DIABETES MELLITUS WITHOUT COMPLICATIONS: ICD-10-CM

## 2025-02-21 DIAGNOSIS — Z88.5 ALLERGY STATUS TO NARCOTIC AGENT: ICD-10-CM

## 2025-02-21 DIAGNOSIS — C50.911 MALIGNANT NEOPLASM OF UNSPECIFIED SITE OF RIGHT FEMALE BREAST: ICD-10-CM

## 2025-02-21 DIAGNOSIS — M25.551 PAIN IN RIGHT HIP: ICD-10-CM
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
## 2025-03-06 NOTE — PATIENT PROFILE ADULT - NSPROGENPREVTRANSF_GEN_A_NUR
Refill request for     Name from pharmacy: omeprazole 20 mg capsule,delayed release         Will file in chart as: omeprazole (PrilOSEC) 20 MG capsule    Sig: TAKE 1 Capsule BY MOUTH every day FOR 30 DAYS THEN 1 EVERY OTHER DAY FOR 14 DAYS    Disp: 37 capsule    Refills: 0    Start: 3/6/2025    Class: Eprescribe    Non-formulary    Last ordered: 3 weeks ago (2/11/2025) by Fabrice Lemon MD    Last refill: 2/11/2025    Rx #: [id number]    Proton Pump Inhibitor (PPI) Refill Protocol - 12 Month Protocol [id number]/06/2025 11:00 AM   Protocol Details Medication (including dose and sig) on current meds list    Seen by prescribing provider or same department within the last 12 months or has a future appt in 3 months - IF FAILED PLEASE LOOK AT CHART REVIEW FOR LAST VISIT AND PROCEED ACCORDINGLY    Not on Clopidogrel (Plavix) or if on, refill is for Pantoprazole (Protonix)      To be filled at: [facility] [place], [place] - [address]        LOV: 5/24/24  Upcoming appointment:  5/27/25  Last Rx: see below    Routed to pcp - refill protocol failed - should pt be taking this every OTHER day?    
yes